# Patient Record
Sex: FEMALE | Race: BLACK OR AFRICAN AMERICAN | URBAN - METROPOLITAN AREA
[De-identification: names, ages, dates, MRNs, and addresses within clinical notes are randomized per-mention and may not be internally consistent; named-entity substitution may affect disease eponyms.]

---

## 2017-06-15 ENCOUNTER — INPATIENT (INPATIENT)
Facility: HOSPITAL | Age: 77
LOS: 10 days | Discharge: NURSING, REHAB OR LONG-TERM CARE | End: 2017-06-26
Attending: HOSPITALIST | Admitting: HOSPITALIST
Payer: MEDICARE

## 2017-06-15 VITALS
TEMPERATURE: 98 F | SYSTOLIC BLOOD PRESSURE: 182 MMHG | HEART RATE: 83 BPM | OXYGEN SATURATION: 99 % | DIASTOLIC BLOOD PRESSURE: 101 MMHG | RESPIRATION RATE: 16 BRPM

## 2017-06-15 DIAGNOSIS — F03.91 UNSPECIFIED DEMENTIA WITH BEHAVIORAL DISTURBANCE: ICD-10-CM

## 2017-06-15 DIAGNOSIS — F03.90 UNSPECIFIED DEMENTIA, UNSPECIFIED SEVERITY, WITHOUT BEHAVIORAL DISTURBANCE, PSYCHOTIC DISTURBANCE, MOOD DISTURBANCE, AND ANXIETY: ICD-10-CM

## 2017-06-15 DIAGNOSIS — I10 ESSENTIAL (PRIMARY) HYPERTENSION: ICD-10-CM

## 2017-06-15 LAB
ALBUMIN SERPL ELPH-MCNC: 3.9 G/DL — SIGNIFICANT CHANGE UP (ref 3.3–5)
ALP SERPL-CCNC: 79 U/L — SIGNIFICANT CHANGE UP (ref 40–120)
ALT FLD-CCNC: 19 U/L — SIGNIFICANT CHANGE UP (ref 4–33)
AST SERPL-CCNC: 40 U/L — HIGH (ref 4–32)
BASOPHILS # BLD AUTO: 0.02 K/UL — SIGNIFICANT CHANGE UP (ref 0–0.2)
BASOPHILS NFR BLD AUTO: 0.3 % — SIGNIFICANT CHANGE UP (ref 0–2)
BILIRUB SERPL-MCNC: 0.5 MG/DL — SIGNIFICANT CHANGE UP (ref 0.2–1.2)
BUN SERPL-MCNC: 19 MG/DL — SIGNIFICANT CHANGE UP (ref 7–23)
CALCIUM SERPL-MCNC: 9.2 MG/DL — SIGNIFICANT CHANGE UP (ref 8.4–10.5)
CHLORIDE SERPL-SCNC: 102 MMOL/L — SIGNIFICANT CHANGE UP (ref 98–107)
CK MB BLD-MCNC: 1.3 — SIGNIFICANT CHANGE UP (ref 0–2.5)
CK MB BLD-MCNC: 10.79 NG/ML — HIGH (ref 1–4.7)
CK MB BLD-MCNC: 9 NG/ML — HIGH (ref 1–4.7)
CK SERPL-CCNC: 716 U/L — HIGH (ref 25–170)
CK SERPL-CCNC: 938 U/L — HIGH (ref 25–170)
CO2 SERPL-SCNC: 22 MMOL/L — SIGNIFICANT CHANGE UP (ref 22–31)
CREAT SERPL-MCNC: 0.83 MG/DL — SIGNIFICANT CHANGE UP (ref 0.5–1.3)
EOSINOPHIL # BLD AUTO: 0.02 K/UL — SIGNIFICANT CHANGE UP (ref 0–0.5)
EOSINOPHIL NFR BLD AUTO: 0.3 % — SIGNIFICANT CHANGE UP (ref 0–6)
GLUCOSE SERPL-MCNC: 67 MG/DL — LOW (ref 70–99)
HCT VFR BLD CALC: 36.6 % — SIGNIFICANT CHANGE UP (ref 34.5–45)
HGB BLD-MCNC: 11.8 G/DL — SIGNIFICANT CHANGE UP (ref 11.5–15.5)
IMM GRANULOCYTES NFR BLD AUTO: 0.2 % — SIGNIFICANT CHANGE UP (ref 0–1.5)
LYMPHOCYTES # BLD AUTO: 2.48 K/UL — SIGNIFICANT CHANGE UP (ref 1–3.3)
LYMPHOCYTES # BLD AUTO: 38.9 % — SIGNIFICANT CHANGE UP (ref 13–44)
MCHC RBC-ENTMCNC: 29.5 PG — SIGNIFICANT CHANGE UP (ref 27–34)
MCHC RBC-ENTMCNC: 32.2 % — SIGNIFICANT CHANGE UP (ref 32–36)
MCV RBC AUTO: 91.5 FL — SIGNIFICANT CHANGE UP (ref 80–100)
MONOCYTES # BLD AUTO: 0.54 K/UL — SIGNIFICANT CHANGE UP (ref 0–0.9)
MONOCYTES NFR BLD AUTO: 8.5 % — SIGNIFICANT CHANGE UP (ref 2–14)
NEUTROPHILS # BLD AUTO: 3.31 K/UL — SIGNIFICANT CHANGE UP (ref 1.8–7.4)
NEUTROPHILS NFR BLD AUTO: 51.8 % — SIGNIFICANT CHANGE UP (ref 43–77)
PLATELET # BLD AUTO: 227 K/UL — SIGNIFICANT CHANGE UP (ref 150–400)
PMV BLD: 9.3 FL — SIGNIFICANT CHANGE UP (ref 7–13)
POTASSIUM SERPL-MCNC: 4.2 MMOL/L — SIGNIFICANT CHANGE UP (ref 3.5–5.3)
POTASSIUM SERPL-SCNC: 4.2 MMOL/L — SIGNIFICANT CHANGE UP (ref 3.5–5.3)
PROT SERPL-MCNC: 7.2 G/DL — SIGNIFICANT CHANGE UP (ref 6–8.3)
RBC # BLD: 4 M/UL — SIGNIFICANT CHANGE UP (ref 3.8–5.2)
RBC # FLD: 13.1 % — SIGNIFICANT CHANGE UP (ref 10.3–14.5)
SODIUM SERPL-SCNC: 142 MMOL/L — SIGNIFICANT CHANGE UP (ref 135–145)
TROPONIN T SERPL-MCNC: < 0.06 NG/ML — SIGNIFICANT CHANGE UP (ref 0–0.06)
TROPONIN T SERPL-MCNC: < 0.06 NG/ML — SIGNIFICANT CHANGE UP (ref 0–0.06)
TSH SERPL-MCNC: 0.81 UIU/ML — SIGNIFICANT CHANGE UP (ref 0.27–4.2)
WBC # BLD: 6.38 K/UL — SIGNIFICANT CHANGE UP (ref 3.8–10.5)
WBC # FLD AUTO: 6.38 K/UL — SIGNIFICANT CHANGE UP (ref 3.8–10.5)

## 2017-06-15 PROCEDURE — 70450 CT HEAD/BRAIN W/O DYE: CPT | Mod: 26

## 2017-06-15 PROCEDURE — 71010: CPT | Mod: 26

## 2017-06-15 PROCEDURE — 99223 1ST HOSP IP/OBS HIGH 75: CPT | Mod: AI

## 2017-06-15 RX ORDER — METOPROLOL TARTRATE 50 MG
1 TABLET ORAL
Qty: 0 | Refills: 0 | COMMUNITY

## 2017-06-15 RX ORDER — METOPROLOL TARTRATE 50 MG
25 TABLET ORAL DAILY
Qty: 0 | Refills: 0 | Status: DISCONTINUED | OUTPATIENT
Start: 2017-06-15 | End: 2017-06-25

## 2017-06-15 RX ORDER — ATORVASTATIN CALCIUM 80 MG/1
1 TABLET, FILM COATED ORAL
Qty: 0 | Refills: 0 | COMMUNITY

## 2017-06-15 RX ORDER — AMLODIPINE BESYLATE 2.5 MG/1
5 TABLET ORAL DAILY
Qty: 0 | Refills: 0 | Status: DISCONTINUED | OUTPATIENT
Start: 2017-06-15 | End: 2017-06-15

## 2017-06-15 RX ORDER — ENOXAPARIN SODIUM 100 MG/ML
40 INJECTION SUBCUTANEOUS EVERY 24 HOURS
Qty: 0 | Refills: 0 | Status: DISCONTINUED | OUTPATIENT
Start: 2017-06-15 | End: 2017-06-26

## 2017-06-15 RX ORDER — LISINOPRIL 2.5 MG/1
10 TABLET ORAL DAILY
Qty: 0 | Refills: 0 | Status: DISCONTINUED | OUTPATIENT
Start: 2017-06-15 | End: 2017-06-21

## 2017-06-15 RX ORDER — ATORVASTATIN CALCIUM 80 MG/1
40 TABLET, FILM COATED ORAL AT BEDTIME
Qty: 0 | Refills: 0 | Status: DISCONTINUED | OUTPATIENT
Start: 2017-06-15 | End: 2017-06-26

## 2017-06-15 RX ADMIN — ENOXAPARIN SODIUM 40 MILLIGRAM(S): 100 INJECTION SUBCUTANEOUS at 21:23

## 2017-06-15 RX ADMIN — ATORVASTATIN CALCIUM 40 MILLIGRAM(S): 80 TABLET, FILM COATED ORAL at 21:23

## 2017-06-15 RX ADMIN — LISINOPRIL 10 MILLIGRAM(S): 2.5 TABLET ORAL at 21:23

## 2017-06-15 NOTE — ED PROVIDER NOTE - MEDICAL DECISION MAKING DETAILS
76F unknown PMH presenting after being found wandering a gas station. Well appearing, in no apparent distress. Will attempt to contact family again.

## 2017-06-15 NOTE — ED PROVIDER NOTE - PROGRESS NOTE DETAILS
Discussed with patient's cousin; pt is demented at baseline, is known to be combative and demented. No one is able to pick her up from the ED at this time. Pt has had no complaints at home. Per cousin the patient has been combative at home and left while the cousin was taking a nap. Discussed with patient's cousin; pt is demented at baseline, is known to be combative and demented. No one is able to pick her up from the ED at this time. Pt has had no complaints at home. Per cousin the patient has been combative at home and left while the cousin was taking a nap. Spoke cousin Di: [phone]  JAMIE Sánchez Cortney: Family member confirms dementia, no other PMH. No one able to come to ED for pt or confirm baseline mental status Will get basic AMS w/u, likely social admit. Nba: density noted on head ct in right ear. pt w/i abundant cerumen in b/l ear canals. cerumen removed from right, still some remaining- pt refusing further removal at this time. ear left ear cleared. will reattempt on right. Nba pgy2: density noted on head ct in right ear. pt w/i abundant cerumen in b/l ear canals. cerumen removed from right, still some remaining- pt refusing further removal at this time. ear left ear cleared. will reattempt on right.

## 2017-06-15 NOTE — ED ADULT NURSE NOTE - OBJECTIVE STATEMENT
Patient a&ox2 (self and location), ambulatory, arrived to ED room C. Patient found on street by EMS per triage note. Patient cannot state why she left her home/ where she was going. Patient states "I was getting my medicine"/ "it was too cold"/ "I was going..i don't know ill tell you later". Patient states it is 1940s, she lives with her mom who is in her 50s. Patient states feel safe at home, did not want to leave family, no thoughts of hurting herself or others. States might have fallen a couple days ago, unsure. Cannot recall medical history. No abrasions noted. Blanchable redness noted to sacrum. BP elevated, MD aware. Pending orders.

## 2017-06-15 NOTE — ED PROVIDER NOTE - ATTENDING CONTRIBUTION TO CARE
76F PMH unknown pmh found wandering in street. Denying any medical complaints. Vitals wnl, exam -very pleasant, slightly confused, non-focal.  ddx: Likely related to dementia  Will attempt to contact family member. If unable then basic AMS w/u - cbc, cmp, tsh, UA, CXR, CTH.

## 2017-06-15 NOTE — H&P ADULT - HISTORY OF PRESENT ILLNESS
76 y.o. Female w/ hx mild dementia who was recently discharged from a [place] assisted living facility (for unknown reason) and now living with her cousin "Janel" was found wandering a gas station. When asked patient doesn't recall why she is in the hospital and wants to go home. The Assisted living facility and Brother Lamont [phone] were not reachable by myself or SW. Patient denies cp, SOB, HA,  palpitations, abdominal pain, N/V/D. In ED BPs were elevated but improved without any antihypertensive meds.

## 2017-06-15 NOTE — H&P ADULT - PROBLEM SELECTOR PLAN 2
Needs NH placement since brother or cousin can't take care of her.   SW and case management consult in a.m.

## 2017-06-15 NOTE — ED PROVIDER NOTE - CONSTITUTIONAL, MLM
normal... Well appearing, well nourished, awake, alert, oriented to person, place and in no apparent distress. - - -

## 2017-06-15 NOTE — ED ADULT TRIAGE NOTE - CHIEF COMPLAINT QUOTE
Pt BIBA after being found wandering around a gas station since midnight. Denies any medical complaints at this time. EMS contacted family- pt has been missing since yesterday and states that she is A&Ox1 at baseline. Family is on their way

## 2017-06-15 NOTE — H&P ADULT - ASSESSMENT
76 y.o. Female w/ Hx Dementia p/w Uncontrolled HTN and wandering due to dementia in need of NH placement.

## 2017-06-15 NOTE — ED PROVIDER NOTE - OBJECTIVE STATEMENT
76F unknown PMH presenting after being found wandering a gas station. Pt denies all complaints at this time. States that she cant remember how she got to the hospital. Does not believe that she fell or got hurt. 76F unknown PMH presenting after being found wandering a gas station. Pt denies all complaints at this time. States that she cant remember how she got to the hospital. Does not believe that she fell or got hurt. Does not knopw family members names/numbers. Denying any complaints - no HA, SOB/CP, NVD, abd pain, urinary complaints, URI symptoms.

## 2017-06-15 NOTE — H&P ADULT - PROBLEM SELECTOR PLAN 1
start Norvasc 5mg PO qdaily since EKG shows LVH. start Norvasc 5mg PO qdaily since EKG shows LVH.  Need to verify home meds. resume Lisinopril and metorpolol.

## 2017-06-16 DIAGNOSIS — I10 ESSENTIAL (PRIMARY) HYPERTENSION: ICD-10-CM

## 2017-06-16 DIAGNOSIS — G93.41 METABOLIC ENCEPHALOPATHY: ICD-10-CM

## 2017-06-16 LAB
ALBUMIN SERPL ELPH-MCNC: 3.7 G/DL — SIGNIFICANT CHANGE UP (ref 3.3–5)
ALP SERPL-CCNC: 82 U/L — SIGNIFICANT CHANGE UP (ref 40–120)
ALT FLD-CCNC: 20 U/L — SIGNIFICANT CHANGE UP (ref 4–33)
APPEARANCE UR: CLEAR — SIGNIFICANT CHANGE UP
AST SERPL-CCNC: 40 U/L — HIGH (ref 4–32)
BASOPHILS # BLD AUTO: 0.02 K/UL — SIGNIFICANT CHANGE UP (ref 0–0.2)
BASOPHILS NFR BLD AUTO: 0.4 % — SIGNIFICANT CHANGE UP (ref 0–2)
BILIRUB SERPL-MCNC: 0.6 MG/DL — SIGNIFICANT CHANGE UP (ref 0.2–1.2)
BILIRUB UR-MCNC: NEGATIVE — SIGNIFICANT CHANGE UP
BLOOD UR QL VISUAL: NEGATIVE — SIGNIFICANT CHANGE UP
BUN SERPL-MCNC: 19 MG/DL — SIGNIFICANT CHANGE UP (ref 7–23)
CALCIUM SERPL-MCNC: 9.3 MG/DL — SIGNIFICANT CHANGE UP (ref 8.4–10.5)
CHLORIDE SERPL-SCNC: 102 MMOL/L — SIGNIFICANT CHANGE UP (ref 98–107)
CK SERPL-CCNC: 709 U/L — HIGH (ref 25–170)
CO2 SERPL-SCNC: 23 MMOL/L — SIGNIFICANT CHANGE UP (ref 22–31)
COLOR SPEC: SIGNIFICANT CHANGE UP
CREAT SERPL-MCNC: 0.85 MG/DL — SIGNIFICANT CHANGE UP (ref 0.5–1.3)
EOSINOPHIL # BLD AUTO: 0.05 K/UL — SIGNIFICANT CHANGE UP (ref 0–0.5)
EOSINOPHIL NFR BLD AUTO: 1.1 % — SIGNIFICANT CHANGE UP (ref 0–6)
GLUCOSE SERPL-MCNC: 73 MG/DL — SIGNIFICANT CHANGE UP (ref 70–99)
GLUCOSE UR-MCNC: NEGATIVE — SIGNIFICANT CHANGE UP
HCT VFR BLD CALC: 38.6 % — SIGNIFICANT CHANGE UP (ref 34.5–45)
HGB BLD-MCNC: 12.5 G/DL — SIGNIFICANT CHANGE UP (ref 11.5–15.5)
IMM GRANULOCYTES NFR BLD AUTO: 0.2 % — SIGNIFICANT CHANGE UP (ref 0–1.5)
KETONES UR-MCNC: NEGATIVE — SIGNIFICANT CHANGE UP
LEUKOCYTE ESTERASE UR-ACNC: NEGATIVE — SIGNIFICANT CHANGE UP
LYMPHOCYTES # BLD AUTO: 2.21 K/UL — SIGNIFICANT CHANGE UP (ref 1–3.3)
LYMPHOCYTES # BLD AUTO: 47.4 % — HIGH (ref 13–44)
MCHC RBC-ENTMCNC: 29.8 PG — SIGNIFICANT CHANGE UP (ref 27–34)
MCHC RBC-ENTMCNC: 32.4 % — SIGNIFICANT CHANGE UP (ref 32–36)
MCV RBC AUTO: 91.9 FL — SIGNIFICANT CHANGE UP (ref 80–100)
MONOCYTES # BLD AUTO: 0.44 K/UL — SIGNIFICANT CHANGE UP (ref 0–0.9)
MONOCYTES NFR BLD AUTO: 9.4 % — SIGNIFICANT CHANGE UP (ref 2–14)
MUCOUS THREADS # UR AUTO: SIGNIFICANT CHANGE UP
NEUTROPHILS # BLD AUTO: 1.93 K/UL — SIGNIFICANT CHANGE UP (ref 1.8–7.4)
NEUTROPHILS NFR BLD AUTO: 41.5 % — LOW (ref 43–77)
NITRITE UR-MCNC: NEGATIVE — SIGNIFICANT CHANGE UP
PH UR: 6.5 — SIGNIFICANT CHANGE UP (ref 4.6–8)
PLATELET # BLD AUTO: 230 K/UL — SIGNIFICANT CHANGE UP (ref 150–400)
PMV BLD: 9.6 FL — SIGNIFICANT CHANGE UP (ref 7–13)
POTASSIUM SERPL-MCNC: 4.4 MMOL/L — SIGNIFICANT CHANGE UP (ref 3.5–5.3)
POTASSIUM SERPL-SCNC: 4.4 MMOL/L — SIGNIFICANT CHANGE UP (ref 3.5–5.3)
PROT SERPL-MCNC: 7.1 G/DL — SIGNIFICANT CHANGE UP (ref 6–8.3)
PROT UR-MCNC: NEGATIVE — SIGNIFICANT CHANGE UP
RBC # BLD: 4.2 M/UL — SIGNIFICANT CHANGE UP (ref 3.8–5.2)
RBC # FLD: 13.2 % — SIGNIFICANT CHANGE UP (ref 10.3–14.5)
RBC CASTS # UR COMP ASSIST: SIGNIFICANT CHANGE UP (ref 0–?)
SODIUM SERPL-SCNC: 141 MMOL/L — SIGNIFICANT CHANGE UP (ref 135–145)
SP GR SPEC: 1.01 — SIGNIFICANT CHANGE UP (ref 1–1.03)
SQUAMOUS # UR AUTO: SIGNIFICANT CHANGE UP
UROBILINOGEN FLD QL: NORMAL E.U. — SIGNIFICANT CHANGE UP (ref 0.1–0.2)
WBC # BLD: 4.66 K/UL — SIGNIFICANT CHANGE UP (ref 3.8–10.5)
WBC # FLD AUTO: 4.66 K/UL — SIGNIFICANT CHANGE UP (ref 3.8–10.5)
WBC UR QL: SIGNIFICANT CHANGE UP (ref 0–?)

## 2017-06-16 PROCEDURE — 99223 1ST HOSP IP/OBS HIGH 75: CPT

## 2017-06-16 RX ORDER — HALOPERIDOL DECANOATE 100 MG/ML
1 INJECTION INTRAMUSCULAR EVERY 6 HOURS
Qty: 0 | Refills: 0 | Status: DISCONTINUED | OUTPATIENT
Start: 2017-06-16 | End: 2017-06-26

## 2017-06-16 RX ORDER — QUETIAPINE FUMARATE 200 MG/1
50 TABLET, FILM COATED ORAL EVERY 6 HOURS
Qty: 0 | Refills: 0 | Status: DISCONTINUED | OUTPATIENT
Start: 2017-06-16 | End: 2017-06-26

## 2017-06-16 RX ADMIN — ENOXAPARIN SODIUM 40 MILLIGRAM(S): 100 INJECTION SUBCUTANEOUS at 21:40

## 2017-06-16 RX ADMIN — LISINOPRIL 10 MILLIGRAM(S): 2.5 TABLET ORAL at 06:14

## 2017-06-16 RX ADMIN — ATORVASTATIN CALCIUM 40 MILLIGRAM(S): 80 TABLET, FILM COATED ORAL at 21:40

## 2017-06-16 RX ADMIN — Medication 25 MILLIGRAM(S): at 11:14

## 2017-06-16 NOTE — BEHAVIORAL HEALTH ASSESSMENT NOTE - NSBHCONSULTRECOMMENDOTHER_PSY_A_CORE FT
1 - Cannot leave AMA  2 - Cannot make medical decisions  3 - Does not require standing medications since pt has not had any behavioral issues at this time.  4 - For agitation, Haldol 1mg PO/IV/IM q6h   5 - For anxiety or insomnia, Seroquel 50mg PO q6h  6 - Will follow.  Will need safe d/c planning

## 2017-06-16 NOTE — BEHAVIORAL HEALTH ASSESSMENT NOTE - HPI (INCLUDE ILLNESS QUALITY, SEVERITY, DURATION, TIMING, CONTEXT, MODIFYING FACTORS, ASSOCIATED SIGNS AND SYMPTOMS)
HPI:  76 y.o. Female w/ hx mild dementia who was recently discharged from a [place] assisted living facility (for unknown reason) and now living with her cousin "Janel" was found wandering a gas station. When asked patient doesn't recall why she is in the hospital and wants to go home. The Assisted living facility and Brother Lamont [phone] were not reachable by myself or SW. Patient denies cp, SOB, HA,  palpitations, abdominal pain, N/V/D. In ED BPs were elevated but improved without any antihypertensive meds. (15 Enoch 2017 15:08)    Seen and examined at bedside.  Pt calm and cooperative though confused.  Oriented to self only.  Pt is a poor historian and provides unreliable history.  Denies mood or psychotic sxs.  Denies SI/HI/AH/VH.      Per family, pt has been living is assisted living facilities for ~2 years.  States she has been going from facility to facility in [place].  Was recently "kicked out" of a facility in [place] and was temporarily living with cousin Di in [place].  Pt is  with 1 surviving son and 3 grandchildren.  Her brother Lamont has been responsible for her care, but he is unable to continue as he himself resides in a nursing home s/p stroke and seizure.  Lamont hopes that granddaughter Libertad will assume responsibility for pt.      Di (cousin) [phone]   Lamont (brother) [phone]  Libertad (granddaughter) [phone]  Eladio Pardo (SW from most recent assisted living) [phone]

## 2017-06-16 NOTE — BEHAVIORAL HEALTH ASSESSMENT NOTE - NSBHCHARTREVIEWVS_PSY_A_CORE FT
Vital Signs Last 24 Hrs  T(C): 36.9, Max: 37.3 (06-15 @ 20:03)  T(F): 98.4, Max: 99.1 (06-15 @ 20:03)  HR: 62 (61 - 66)  BP: 147/68 (126/59 - 153/61)  BP(mean): 91 (91 - 91)  RR: 18 (17 - 18)  SpO2: 100% (99% - 100%)

## 2017-06-16 NOTE — PROGRESS NOTE ADULT - PROBLEM SELECTOR PLAN 2
per report from family pt has had behavioral issues and requires placement per report from family pt has had behavioral issues and requires placement or aides.  CM working with family for dispo

## 2017-06-16 NOTE — PROGRESS NOTE ADULT - SUBJECTIVE AND OBJECTIVE BOX
Patient is a 76y old  Female who presents with metabolic enceph likely due to dementia      SUBJECTIVE / OVERNIGHT EVENTS: pleasant, eating lunch    MEDICATIONS  (STANDING):  enoxaparin Injectable 40milliGRAM(s) SubCutaneous every 24 hours  metoprolol succinate ER 25milliGRAM(s) Oral daily  lisinopril 10milliGRAM(s) Oral daily  atorvastatin 40milliGRAM(s) Oral at bedtime    MEDICATIONS  (PRN):      Vital Signs Last 24 Hrs  T(C): 36.8, Max: 37.3 (06-15 @ 20:03)  HR: 66 (61 - 66)  BP: 126/59 (126/59 - 153/61)  RR: 17 (17 - 18)  SpO2: 99% (99% - 100%)      PHYSICAL EXAM:  GENERAL: NAD, well-developed  HEAD:  Atraumatic, Normocephalic  EYES: EOMI, PERRLA, conjunctiva and sclera clear  NECK: Supple, No JVD  CHEST/LUNG: Clear to auscultation bilaterally; No wheeze  HEART: Regular rate and rhythm; No murmurs, rubs, or gallops  ABDOMEN: Soft, Nontender, Nondistended; Bowel sounds present  EXTREMITIES:  2+ Peripheral Pulses, No clubbing, cyanosis, or edema  PSYCH: AAOx3  NEUROLOGY: non-focal  SKIN: No rashes or lesions    LABS:                        12.5   4.66  )-----------( 230      ( 16 Jun 2017 05:45 )             38.6     06-16    141  |  102  |  19  ----------------------------<  73  4.4   |  23  |  0.85    Ca    9.3      16 Jun 2017 05:45    TPro  7.1  /  Alb  3.7  /  TBili  0.6  /  DBili  x   /  AST  40<H>  /  ALT  20  /  AlkPhos  82  06-16      CARDIAC MARKERS ( 16 Jun 2017 05:45 )  x     / x     / 709 u/L / x     / x      CARDIAC MARKERS ( 15 Enoch 2017 14:11 )  x     / < 0.06 ng/mL / 938 u/L / 10.79 ng/mL / x      CARDIAC MARKERS ( 15 Enoch 2017 07:30 )  x     / < 0.06 ng/mL / 716 u/L / 9.00 ng/mL / x          : Patient is a 76y old  Female who presents with metabolic enceph likely due to dementia      SUBJECTIVE / OVERNIGHT EVENTS: pleasant, eating lunch    MEDICATIONS  (STANDING):  enoxaparin Injectable 40milliGRAM(s) SubCutaneous every 24 hours  metoprolol succinate ER 25milliGRAM(s) Oral daily  lisinopril 10milliGRAM(s) Oral daily  atorvastatin 40milliGRAM(s) Oral at bedtime    MEDICATIONS  (PRN):      Vital Signs Last 24 Hrs  T(C): 36.8, Max: 37.3 (06-15 @ 20:03)  HR: 66 (61 - 66)  BP: 126/59 (126/59 - 153/61)  RR: 17 (17 - 18)  SpO2: 99% (99% - 100%)      PHYSICAL EXAM:  GENERAL: NAD, well-developed  HEAD:  Atraumatic, Normocephalic  EYES: EOMI, PERRLA, conjunctiva and sclera clear  CHEST/LUNG: non labored  ABDOMEN: Soft, Nontender, Nondistended  EXTREMITIES:  No clubbing, cyanosis, or edema  PSYCH: pleasant but confused,   NEUROLOGY: amb      LABS:                        12.5   4.66  )-----------( 230      ( 16 Jun 2017 05:45 )             38.6     06-16    141  |  102  |  19  ----------------------------<  73  4.4   |  23  |  0.85    Ca    9.3      16 Jun 2017 05:45    TPro  7.1  /  Alb  3.7  /  TBili  0.6  /  DBili  x   /  AST  40<H>  /  ALT  20  /  AlkPhos  82  06-16      CARDIAC MARKERS ( 16 Jun 2017 05:45 )  x     / x     / 709 u/L / x     / x      CARDIAC MARKERS ( 15 Enoch 2017 14:11 )  x     / < 0.06 ng/mL / 938 u/L / 10.79 ng/mL / x      CARDIAC MARKERS ( 15 Enoch 2017 07:30 )  x     / < 0.06 ng/mL / 716 u/L / 9.00 ng/mL / x          :

## 2017-06-16 NOTE — BEHAVIORAL HEALTH ASSESSMENT NOTE - NSBHCHARTREVIEWLAB_PSY_A_CORE FT
12.5   4.66  )-----------( 230      ( 16 Jun 2017 05:45 )             38.6   06-16    141  |  102  |  19  ----------------------------<  73  4.4   |  23  |  0.85    Ca    9.3      16 Jun 2017 05:45    TPro  7.1  /  Alb  3.7  /  TBili  0.6  /  DBili  x   /  AST  40<H>  /  ALT  20  /  AlkPhos  82  06-16    Creatine Kinase, Serum: 709 (06.16.17 @ 05:45)  Creatine Kinase, Serum: 938 (06.15.17 @ 14:11)  CKMB: 10.79 ng/mL (06.15.17 @ 14:11)  CKMB: 9.00 ng/mL (06.15.17 @ 07:30)

## 2017-06-16 NOTE — BEHAVIORAL HEALTH ASSESSMENT NOTE - NSBHCHARTREVIEWIMAGING_PSY_A_CORE FT
EXAM:  CT BRAIN    PROCEDURE DATE:  Enoch 15 2017   IMPRESSION:    No acute intracranial hemorrhage or mass effect. If symptoms persist,   short-term follow-up with brain CT or MRI is recommended if there are no   contraindications.    An indeterminate metallic density is noted within the right external   auditory canal. Although this may reflect a hearing device, correlation   with direct visualization is recommended to exclude possibility of a   foreign body.    EXAM:  RAD CHEST PORTABLE ROUTINE    PROCEDURE DATE:  Enoch 15 2017   IMPRESSION:  No acute pulmonary disease.

## 2017-06-16 NOTE — BEHAVIORAL HEALTH ASSESSMENT NOTE - CASE SUMMARY
77 yo BF with dementia   1-patient will need placement  2-patient cannot make any medical decisions or discharge planning  3-haldol 1mg prn Q6 hour PO/IM/IV

## 2017-06-16 NOTE — PHYSICAL THERAPY INITIAL EVALUATION ADULT - PERTINENT HX OF CURRENT PROBLEM, REHAB EVAL
This is a 77 y/o Female w/ Hx Dementia p/w Uncontrolled HTN and wandering due to dementia in need of NH placement.

## 2017-06-16 NOTE — PHYSICAL THERAPY INITIAL EVALUATION ADULT - CRITERIA FOR SKILLED THERAPEUTIC INTERVENTIONS
Patient is functioning independently at this time with no LOB,hence not a candidate for Restorative PT .

## 2017-06-17 LAB
BASOPHILS # BLD AUTO: 0.02 K/UL — SIGNIFICANT CHANGE UP (ref 0–0.2)
BASOPHILS NFR BLD AUTO: 0.4 % — SIGNIFICANT CHANGE UP (ref 0–2)
BUN SERPL-MCNC: 18 MG/DL — SIGNIFICANT CHANGE UP (ref 7–23)
CALCIUM SERPL-MCNC: 9.1 MG/DL — SIGNIFICANT CHANGE UP (ref 8.4–10.5)
CHLORIDE SERPL-SCNC: 104 MMOL/L — SIGNIFICANT CHANGE UP (ref 98–107)
CK MB BLD-MCNC: 1.1 — SIGNIFICANT CHANGE UP (ref 0–2.5)
CK MB BLD-MCNC: 5.69 NG/ML — HIGH (ref 1–4.7)
CK SERPL-CCNC: 538 U/L — HIGH (ref 25–170)
CO2 SERPL-SCNC: 23 MMOL/L — SIGNIFICANT CHANGE UP (ref 22–31)
CREAT SERPL-MCNC: 0.86 MG/DL — SIGNIFICANT CHANGE UP (ref 0.5–1.3)
EOSINOPHIL # BLD AUTO: 0.05 K/UL — SIGNIFICANT CHANGE UP (ref 0–0.5)
EOSINOPHIL NFR BLD AUTO: 0.9 % — SIGNIFICANT CHANGE UP (ref 0–6)
GLUCOSE SERPL-MCNC: 75 MG/DL — SIGNIFICANT CHANGE UP (ref 70–99)
HCT VFR BLD CALC: 38.6 % — SIGNIFICANT CHANGE UP (ref 34.5–45)
HGB BLD-MCNC: 12.4 G/DL — SIGNIFICANT CHANGE UP (ref 11.5–15.5)
IMM GRANULOCYTES NFR BLD AUTO: 0.2 % — SIGNIFICANT CHANGE UP (ref 0–1.5)
LYMPHOCYTES # BLD AUTO: 3.01 K/UL — SIGNIFICANT CHANGE UP (ref 1–3.3)
LYMPHOCYTES # BLD AUTO: 53.8 % — HIGH (ref 13–44)
MCHC RBC-ENTMCNC: 29.6 PG — SIGNIFICANT CHANGE UP (ref 27–34)
MCHC RBC-ENTMCNC: 32.1 % — SIGNIFICANT CHANGE UP (ref 32–36)
MCV RBC AUTO: 92.1 FL — SIGNIFICANT CHANGE UP (ref 80–100)
MONOCYTES # BLD AUTO: 0.41 K/UL — SIGNIFICANT CHANGE UP (ref 0–0.9)
MONOCYTES NFR BLD AUTO: 7.3 % — SIGNIFICANT CHANGE UP (ref 2–14)
NEUTROPHILS # BLD AUTO: 2.09 K/UL — SIGNIFICANT CHANGE UP (ref 1.8–7.4)
NEUTROPHILS NFR BLD AUTO: 37.4 % — LOW (ref 43–77)
PLATELET # BLD AUTO: 239 K/UL — SIGNIFICANT CHANGE UP (ref 150–400)
PMV BLD: 9.6 FL — SIGNIFICANT CHANGE UP (ref 7–13)
POTASSIUM SERPL-MCNC: 4.3 MMOL/L — SIGNIFICANT CHANGE UP (ref 3.5–5.3)
POTASSIUM SERPL-SCNC: 4.3 MMOL/L — SIGNIFICANT CHANGE UP (ref 3.5–5.3)
RBC # BLD: 4.19 M/UL — SIGNIFICANT CHANGE UP (ref 3.8–5.2)
RBC # FLD: 13 % — SIGNIFICANT CHANGE UP (ref 10.3–14.5)
SODIUM SERPL-SCNC: 141 MMOL/L — SIGNIFICANT CHANGE UP (ref 135–145)
WBC # BLD: 5.59 K/UL — SIGNIFICANT CHANGE UP (ref 3.8–10.5)
WBC # FLD AUTO: 5.59 K/UL — SIGNIFICANT CHANGE UP (ref 3.8–10.5)

## 2017-06-17 PROCEDURE — 99231 SBSQ HOSP IP/OBS SF/LOW 25: CPT

## 2017-06-17 RX ADMIN — ATORVASTATIN CALCIUM 40 MILLIGRAM(S): 80 TABLET, FILM COATED ORAL at 21:46

## 2017-06-17 RX ADMIN — LISINOPRIL 10 MILLIGRAM(S): 2.5 TABLET ORAL at 05:18

## 2017-06-17 RX ADMIN — ENOXAPARIN SODIUM 40 MILLIGRAM(S): 100 INJECTION SUBCUTANEOUS at 21:46

## 2017-06-17 NOTE — PROGRESS NOTE ADULT - ASSESSMENT
Assessment and Plan:   75 yo female with ho dementia a/w metabolic enceph due to dementia.  Family unable to care for pt    1.Metabolic encephalopathy.     likely due to dementia  f/u with psych     2. Dementia with behavioral disturbance, unspecified dementia type.    require placement due to her behavioural issues     CM working with family for dispoper report from family pt has had behavioral issues and requires placement.     3. HTN (hypertension).    better controlled   cont lisinopril and metoprolol  monitor BP, HR.     Gi and DVT prophylaxis

## 2017-06-17 NOTE — PROGRESS NOTE ADULT - SUBJECTIVE AND OBJECTIVE BOX
76y old  Female who presents with metabolic enceph likely due to dementia    SUBJECTIVE / OVERNIGHT EVENTS:  no acute issue   pleasant and not in distress , walking in hallway     MEDICATIONS  (STANDING):  enoxaparin Injectable 40milliGRAM(s) SubCutaneous every 24 hours  metoprolol succinate ER 25milliGRAM(s) Oral daily  lisinopril 10milliGRAM(s) Oral daily  atorvastatin 40milliGRAM(s) Oral at bedtime    MEDICATIONS  (PRN):  haloperidol     Tablet 1milliGRAM(s) Oral every 6 hours PRN Agitation  haloperidol    Injectable 1milliGRAM(s) IV Push every 6 hours PRN Agitation  haloperidol    Injectable 1milliGRAM(s) IntraMuscular every 6 hours PRN Agitation  QUEtiapine 50milliGRAM(s) Oral every 6 hours PRN Anxiety or insomnia      Vital Signs Last 24 Hrs  T(C): 36.8, Max: 36.9 (06-16 @ 14:16)  T(F): 98.3, Max: 98.4 (06-16 @ 14:16)  HR: 65 (54 - 68)  BP: 141/81 (121/65 - 160/82)  BP(mean): --  RR: 18 (17 - 18)  SpO2: 100% (97% - 100%)    PHYSICAL EXAM:  GENERAL: NAD, well-developed  HEAD:  Atraumatic, Normocephalic  EYES: EOMI, PERRLA, conjunctiva and sclera clear  CHEST/LUNG: non labored  ABDOMEN: Soft, Nontender, Nondistended  EXTREMITIES:  No clubbing, cyanosis, or edema  PSYCH: pleasant but confused,   NEUROLOGY: amb                          12.4   5.59  )-----------( 239      ( 17 Jun 2017 06:30 )             38.6       06-17    141  |  104  |  18  ----------------------------<  75  4.3   |  23  |  0.86    Ca    9.1      17 Jun 2017 06:30    TPro  7.1  /  Alb  3.7  /  TBili  0.6  /  DBili  x   /  AST  40<H>  /  ALT  20  /  AlkPhos  82  06-16    troponin x 3 -ve 76y old  Female who presents with metabolic enceph likely due to dementia    SUBJECTIVE / OVERNIGHT EVENTS:  no acute issue   pleasant and not in distress , walking in hallway     MEDICATIONS  (STANDING):  enoxaparin Injectable 40milliGRAM(s) SubCutaneous every 24 hours  metoprolol succinate ER 25milliGRAM(s) Oral daily  lisinopril 10milliGRAM(s) Oral daily  atorvastatin 40milliGRAM(s) Oral at bedtime    MEDICATIONS  (PRN):  haloperidol     Tablet 1milliGRAM(s) Oral every 6 hours PRN Agitation  haloperidol    Injectable 1milliGRAM(s) IV Push every 6 hours PRN Agitation  haloperidol    Injectable 1milliGRAM(s) IntraMuscular every 6 hours PRN Agitation  QUEtiapine 50milliGRAM(s) Oral every 6 hours PRN Anxiety or insomnia      Vital Signs Last 24 Hrs  T(C): 36.8, Max: 36.9 (06-16 @ 14:16)  T(F): 98.3, Max: 98.4 (06-16 @ 14:16)  HR: 65 (54 - 68)  BP: 141/81 (121/65 - 160/82)  BP(mean): --  RR: 18 (17 - 18)  SpO2: 100% (97% - 100%)    PHYSICAL EXAM:  GENERAL: NAD, well-developed  HEAD:  Atraumatic, Normocephalic  EYES: EOMI, PERRLA, conjunctiva and sclera clear  CHEST/LUNG: non labored  ABDOMEN: Soft, Nontender, Nondistended  EXTREMITIES:  No clubbing, cyanosis, or edema  PSYCH: pleasant but confused,   NEUROLOGY: amb                          12.4   5.59  )-----------( 239      ( 17 Jun 2017 06:30 )             38.6       06-17    141  |  104  |  18  ----------------------------<  75  4.3   |  23  |  0.86    Ca    9.1      17 Jun 2017 06:30    TPro  7.1  /  Alb  3.7  /  TBili  0.6  /  DBili  x   /  AST  40<H>  /  ALT  20  /  AlkPhos  82  06-16    troponin x 2 -ve

## 2017-06-18 LAB
BACTERIA UR CULT: SIGNIFICANT CHANGE UP
SPECIMEN SOURCE: SIGNIFICANT CHANGE UP

## 2017-06-18 PROCEDURE — 99231 SBSQ HOSP IP/OBS SF/LOW 25: CPT

## 2017-06-18 RX ADMIN — ATORVASTATIN CALCIUM 40 MILLIGRAM(S): 80 TABLET, FILM COATED ORAL at 21:27

## 2017-06-18 RX ADMIN — ENOXAPARIN SODIUM 40 MILLIGRAM(S): 100 INJECTION SUBCUTANEOUS at 21:27

## 2017-06-18 RX ADMIN — Medication 25 MILLIGRAM(S): at 09:21

## 2017-06-18 RX ADMIN — LISINOPRIL 10 MILLIGRAM(S): 2.5 TABLET ORAL at 05:48

## 2017-06-18 NOTE — PROGRESS NOTE ADULT - SUBJECTIVE AND OBJECTIVE BOX
76y old  Female who presents with metabolic enceph likely due to dementia.    SUBJECTIVE / OVERNIGHT EVENTS:  no acute issue   pleasant and not in distress     MEDICATIONS  (STANDING):  enoxaparin Injectable 40milliGRAM(s) SubCutaneous every 24 hours  metoprolol succinate ER 25milliGRAM(s) Oral daily  lisinopril 10milliGRAM(s) Oral daily  atorvastatin 40milliGRAM(s) Oral at bedtime    MEDICATIONS  (PRN):  haloperidol     Tablet 1milliGRAM(s) Oral every 6 hours PRN Agitation  haloperidol    Injectable 1milliGRAM(s) IV Push every 6 hours PRN Agitation  haloperidol    Injectable 1milliGRAM(s) IntraMuscular every 6 hours PRN Agitation  QUEtiapine 50milliGRAM(s) Oral every 6 hours PRN Anxiety or insomnia      Vital Signs Last 24 Hrs  T(C): 36.8, Max: 36.9 (06-17 @ 20:42)  T(F): 98.2, Max: 98.4 (06-17 @ 20:42)  HR: 70 (55 - 73)  BP: 139/75 (139/75 - 163/84)  BP(mean): --  RR: 18 (16 - 18)  SpO2: 100% (100% - 100%)    GENERAL: NAD, well-developed  HEAD:  Atraumatic, Normocephalic  EYES: EOMI, PERRLA, conjunctiva and sclera clear  CHEST/LUNG: non labored  ABDOMEN: Soft, Nontender, Nondistended  EXTREMITIES:  No clubbing, cyanosis, or edema  PSYCH: pleasant but confused,   NEUROLOGY: amb                            12.4   5.59  )-----------( 239      ( 17 Jun 2017 06:30 )             38.6       06-17    141  |  104  |  18  ----------------------------<  76  4.3   |  23  |  0.86    Ca    9.1      17 Jun 2017 06:30

## 2017-06-18 NOTE — PROGRESS NOTE ADULT - ASSESSMENT
Assessment and Plan:   77 yo female with ho dementia a/w metabolic enceph due to dementia.  Family unable to care for pt    1.Metabolic encephalopathy.     likely due to dementia  f/u with psych     2. Dementia with behavioral disturbance, unspecified dementia type.    require placement due to her behavioural issues     CM working with family for dispoper report from family pt has had behavioral issues and requires placement.     3. HTN (hypertension).    better controlled   cont lisinopril and metoprolol  monitor BP, HR.     Gi and DVT prophylaxis     DC planning : KAIT aware

## 2017-06-19 PROCEDURE — 99233 SBSQ HOSP IP/OBS HIGH 50: CPT

## 2017-06-19 RX ADMIN — Medication 25 MILLIGRAM(S): at 05:22

## 2017-06-19 RX ADMIN — LISINOPRIL 10 MILLIGRAM(S): 2.5 TABLET ORAL at 05:22

## 2017-06-19 RX ADMIN — ENOXAPARIN SODIUM 40 MILLIGRAM(S): 100 INJECTION SUBCUTANEOUS at 21:28

## 2017-06-19 RX ADMIN — QUETIAPINE FUMARATE 50 MILLIGRAM(S): 200 TABLET, FILM COATED ORAL at 13:29

## 2017-06-19 RX ADMIN — ATORVASTATIN CALCIUM 40 MILLIGRAM(S): 80 TABLET, FILM COATED ORAL at 21:28

## 2017-06-19 NOTE — PROGRESS NOTE ADULT - SUBJECTIVE AND OBJECTIVE BOX
Patient is a 76y old  Female who presents with a chief complaint of wandering a gas; need NH placement. (15 Enoch 2017 15:08)    SUBJECTIVE / OVERNIGHT EVENTS:  "Doing just fine." Eating without problem.  Says she lives with her "mom who is 60-something."  Oriented to "[facility] 1960-something, President "of course."    MEDICATIONS  (STANDING):  enoxaparin Injectable 40milliGRAM(s) SubCutaneous every 24 hours  metoprolol succinate ER 25milliGRAM(s) Oral daily  lisinopril 10milliGRAM(s) Oral daily  atorvastatin 40milliGRAM(s) Oral at bedtime    MEDICATIONS  (PRN):  haloperidol     Tablet 1milliGRAM(s) Oral every 6 hours PRN Agitation  haloperidol    Injectable 1milliGRAM(s) IV Push every 6 hours PRN Agitation  haloperidol    Injectable 1milliGRAM(s) IntraMuscular every 6 hours PRN Agitation  QUEtiapine 50milliGRAM(s) Oral every 6 hours PRN Anxiety or insomnia    Vital Signs Last 24 Hrs  T(C): 36.6, Max: 36.8 (06-18 @ 20:23)  HR: 80 (53 - 80)  BP: 151/75 (151/75 - 160/79)  RR: 18 (16 - 18)  SpO2: 100% (100% - 100%)  Wt(kg): --  CAPILLARY BLOOD GLUCOSE    PHYSICAL EXAM:  GENERAL: NAD, well-developed  HEAD:  Atraumatic, Normocephalic  EYES: EOMI, PERRLA, conjunctiva and sclera clear  NECK: Supple, No JVD  CHEST/LUNG: Clear to auscultation bilaterally; No wheeze  HEART: Regular rate and rhythm; No murmurs, rubs, or gallops  ABDOMEN: Soft, Nontender, Nondistended; Bowel sounds present  EXTREMITIES:  2+ Peripheral Pulses, No clubbing, cyanosis, or edema  PSYCH:  Oriented to "[facility], 1960-something, President "of course."  Calm and pleasant  NEUROLOGY: non-focal  SKIN: No rashes or lesions    LABS:    RADIOLOGY & ADDITIONAL TESTS:    Imaging Personally Reviewed:    Consultant(s) Notes Reviewed:  psychiatry    Care Discussed with Consultants/Other Providers:

## 2017-06-19 NOTE — PROGRESS NOTE BEHAVIORAL HEALTH - CASE SUMMARY
75 yo BF with Dementia   1- would continue same prns  2-await placement  3-pt cannot make any medical or discharge planning decisions.

## 2017-06-19 NOTE — PROGRESS NOTE BEHAVIORAL HEALTH - NSBHCONSULTRECOMMENDOTHER_PSY_A_CORE FT
1 - Cannot leave AMA  2 - Cannot make medical decisions  3 - Continue PRNs.  No standing medications.  4 - Will follow.  Will need safe d/c planning

## 2017-06-19 NOTE — PROGRESS NOTE BEHAVIORAL HEALTH - SUMMARY
77 yo female with mild dementia who was recently discharged from a [place] assisted living facility and now living with her cousin was found wandering a gas station.  Pleasantly confused.  No behavioral issues.  Needs placement.

## 2017-06-19 NOTE — PROGRESS NOTE BEHAVIORAL HEALTH - NSBHFUPINTERVALHXFT_PSY_A_CORE
No acute events O/N.  No PRNs.  Seen and examined at bedside.  Pt calm and cooperative.  Remains disoriented to time.  Able to state she is in the hospital.  No behavioral issues.  Denies SI/HI/AH/VH.

## 2017-06-19 NOTE — PROGRESS NOTE ADULT - ASSESSMENT
77 yo female with ho dementia a/w metabolic enceph due to dementia.  Reportedly pt recently in a shelter and a medical facility in [place], found wandering, brother Lamont is in rehab after a stroke, granddaughter Libertad involved.

## 2017-06-20 LAB
ANISOCYTOSIS BLD QL: SLIGHT — SIGNIFICANT CHANGE UP
BASOPHILS NFR SPEC: 0 % — SIGNIFICANT CHANGE UP (ref 0–2)
BUN SERPL-MCNC: 20 MG/DL — SIGNIFICANT CHANGE UP (ref 7–23)
CALCIUM SERPL-MCNC: 9.4 MG/DL — SIGNIFICANT CHANGE UP (ref 8.4–10.5)
CHLORIDE SERPL-SCNC: 104 MMOL/L — SIGNIFICANT CHANGE UP (ref 98–107)
CO2 SERPL-SCNC: 25 MMOL/L — SIGNIFICANT CHANGE UP (ref 22–31)
CREAT SERPL-MCNC: 0.97 MG/DL — SIGNIFICANT CHANGE UP (ref 0.5–1.3)
EOSINOPHIL NFR FLD: 2 % — SIGNIFICANT CHANGE UP (ref 0–6)
GLUCOSE SERPL-MCNC: 77 MG/DL — SIGNIFICANT CHANGE UP (ref 70–99)
HCT VFR BLD CALC: 37.2 % — SIGNIFICANT CHANGE UP (ref 34.5–45)
HGB BLD-MCNC: 12.2 G/DL — SIGNIFICANT CHANGE UP (ref 11.5–15.5)
LYMPHOCYTES NFR SPEC AUTO: 56 % — HIGH (ref 13–44)
MACROCYTES BLD QL: SLIGHT — SIGNIFICANT CHANGE UP
MANUAL SMEAR VERIFICATION: SIGNIFICANT CHANGE UP
MCHC RBC-ENTMCNC: 30.6 PG — SIGNIFICANT CHANGE UP (ref 27–34)
MCHC RBC-ENTMCNC: 32.8 % — SIGNIFICANT CHANGE UP (ref 32–36)
MCV RBC AUTO: 93.2 FL — SIGNIFICANT CHANGE UP (ref 80–100)
MONOCYTES NFR BLD: 3 % — SIGNIFICANT CHANGE UP (ref 2–9)
NEUTROPHIL AB SER-ACNC: 39 % — LOW (ref 43–77)
OVALOCYTES BLD QL SMEAR: SLIGHT — SIGNIFICANT CHANGE UP
PLATELET # BLD AUTO: 237 K/UL — SIGNIFICANT CHANGE UP (ref 150–400)
PLATELET COUNT - ESTIMATE: NORMAL — SIGNIFICANT CHANGE UP
PMV BLD: 9.9 FL — SIGNIFICANT CHANGE UP (ref 7–13)
POLYCHROMASIA BLD QL SMEAR: SLIGHT — SIGNIFICANT CHANGE UP
POTASSIUM SERPL-MCNC: 4.2 MMOL/L — SIGNIFICANT CHANGE UP (ref 3.5–5.3)
POTASSIUM SERPL-SCNC: 4.2 MMOL/L — SIGNIFICANT CHANGE UP (ref 3.5–5.3)
RBC # BLD: 3.99 M/UL — SIGNIFICANT CHANGE UP (ref 3.8–5.2)
RBC # FLD: 13.4 % — SIGNIFICANT CHANGE UP (ref 10.3–14.5)
SODIUM SERPL-SCNC: 141 MMOL/L — SIGNIFICANT CHANGE UP (ref 135–145)
WBC # BLD: 4.44 K/UL — SIGNIFICANT CHANGE UP (ref 3.8–10.5)
WBC # FLD AUTO: 4.44 K/UL — SIGNIFICANT CHANGE UP (ref 3.8–10.5)

## 2017-06-20 PROCEDURE — 99232 SBSQ HOSP IP/OBS MODERATE 35: CPT

## 2017-06-20 PROCEDURE — 99233 SBSQ HOSP IP/OBS HIGH 50: CPT

## 2017-06-20 RX ADMIN — Medication 25 MILLIGRAM(S): at 11:18

## 2017-06-20 RX ADMIN — LISINOPRIL 10 MILLIGRAM(S): 2.5 TABLET ORAL at 05:05

## 2017-06-20 RX ADMIN — ENOXAPARIN SODIUM 40 MILLIGRAM(S): 100 INJECTION SUBCUTANEOUS at 21:20

## 2017-06-20 RX ADMIN — ATORVASTATIN CALCIUM 40 MILLIGRAM(S): 80 TABLET, FILM COATED ORAL at 21:20

## 2017-06-20 NOTE — PROGRESS NOTE BEHAVIORAL HEALTH - NSBHFUPINTERVALHXFT_PSY_A_CORE
Received Seroquel 50mg PO at 1329.  Per staff, pt became agitated and was not redirectable.  Responded well to Seroquel PRN.  Seen and examined at bedside.  Pt does not recall outburst yesterday.  Pt calm, cooperative, and pleasant.  Aware she is in the hospital.  Not oriented to time.  No complaints offered.  Denies SI/HI/AH/VH.

## 2017-06-20 NOTE — PROGRESS NOTE ADULT - PROBLEM SELECTOR PLAN 3
cont lisinopril and metoprolol  monitor BP, HR
continue with lisinopril and metoprolol  monitor BP, HR
cont lisinopril and metoprolol  monitor BP, HR

## 2017-06-20 NOTE — PROGRESS NOTE ADULT - SUBJECTIVE AND OBJECTIVE BOX
Patient is a 76y old  Female who presents with a chief complaint of wandering a gas; need NH placement.    SUBJECTIVE / OVERNIGHT EVENTS:  Says she's doing just fine.  Eating and drinking well.  No c/o pain.    MEDICATIONS  (STANDING):  enoxaparin Injectable 40milliGRAM(s) SubCutaneous every 24 hours  metoprolol succinate ER 25milliGRAM(s) Oral daily  lisinopril 10milliGRAM(s) Oral daily  atorvastatin 40milliGRAM(s) Oral at bedtime    MEDICATIONS  (PRN):  haloperidol     Tablet 1milliGRAM(s) Oral every 6 hours PRN Agitation  haloperidol    Injectable 1milliGRAM(s) IV Push every 6 hours PRN Agitation  haloperidol    Injectable 1milliGRAM(s) IntraMuscular every 6 hours PRN Agitation  QUEtiapine 50milliGRAM(s) Oral every 6 hours PRN Anxiety or insomnia    Vital Signs Last 24 Hrs  T(C): 37.1, Max: 37.1 (06-20 @ 13:02)  HR: 67 (52 - 67)  BP: 154/96 (133/72 - 154/96)  RR: 18 (17 - 18)  SpO2: 100% (100% - 100%)  Wt(kg): --  CAPILLARY BLOOD GLUCOSE    PHYSICAL EXAM:  GENERAL: NAD, well-developed  HEAD:  Atraumatic, Normocephalic  EYES: EOMI, PERRLA, conjunctiva and sclera clear  NECK: Supple, No JVD  CHEST/LUNG: Clear to auscultation bilaterally; No wheeze  HEART: Regular rate and rhythm; No murmurs, rubs, or gallops  ABDOMEN: Soft, Nontender, Nondistended; Bowel sounds present  EXTREMITIES:  2+ Peripheral Pulses, No clubbing, cyanosis, or edema  PSYCH: calm  NEUROLOGY: non-focal  SKIN: No rashes or lesions    LABS:                        12.2   4.44  )-----------( 237      ( 20 Jun 2017 05:00 )             37.2     06-20    141  |  104  |  20  ----------------------------<  77  4.2   |  25  |  0.97    Ca    9.4      20 Jun 2017 05:00    RADIOLOGY & ADDITIONAL TESTS:    Imaging Personally Reviewed:    Consultant(s) Notes Reviewed:      Care Discussed with Consultants/Other Providers:

## 2017-06-20 NOTE — PROGRESS NOTE BEHAVIORAL HEALTH - SUMMARY
75 yo female with mild dementia who was recently discharged from a [place] assisted living facility and now living with her cousin was found wandering a gas station.  Pleasantly confused.  No behavioral issues.  Needs placement.

## 2017-06-20 NOTE — PROGRESS NOTE ADULT - ASSESSMENT
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  Reportedly pt recently in a shelter and a medical facility in [place], found wandering, brother Lamont is in rehab after a stroke, granddaughter Libertad involved.

## 2017-06-20 NOTE — PROGRESS NOTE BEHAVIORAL HEALTH - SUMMARY
75 yo female with mild dementia who was recently discharged from a [place] assisted living facility and now living with her cousin was found wandering a gas station.  Pleasantly confused.  Had behavioral outburst yesterday (6/19) and required PRN medication.

## 2017-06-21 PROCEDURE — 99233 SBSQ HOSP IP/OBS HIGH 50: CPT

## 2017-06-21 PROCEDURE — 99232 SBSQ HOSP IP/OBS MODERATE 35: CPT

## 2017-06-21 RX ORDER — LISINOPRIL 2.5 MG/1
20 TABLET ORAL DAILY
Qty: 0 | Refills: 0 | Status: DISCONTINUED | OUTPATIENT
Start: 2017-06-22 | End: 2017-06-26

## 2017-06-21 RX ADMIN — LISINOPRIL 10 MILLIGRAM(S): 2.5 TABLET ORAL at 05:27

## 2017-06-21 RX ADMIN — ATORVASTATIN CALCIUM 40 MILLIGRAM(S): 80 TABLET, FILM COATED ORAL at 21:16

## 2017-06-21 RX ADMIN — ENOXAPARIN SODIUM 40 MILLIGRAM(S): 100 INJECTION SUBCUTANEOUS at 21:17

## 2017-06-21 NOTE — PROGRESS NOTE BEHAVIORAL HEALTH - NSBHCHARTREVIEWLAB_PSY_A_CORE FT
Complete Blood Count + Automated Diff in AM (06.17.17 @ 06:30)    WBC Count: 5.59 K/uL    RBC Count: 4.19 M/uL    Hemoglobin: 12.4 g/dL    Hematocrit: 38.6 %    Mean Cell Volume: 92.1 fL    Mean Cell Hemoglobin: 29.6 pg    Mean Cell Hemoglobin Conc: 32.1 %    Red Cell Distrib Width: 13.0 %    Platelet Count - Automated: 239 K/uL    MPV: 9.6 fl    Auto Neutrophil #: 2.09 K/uL    Auto Lymphocyte #: 3.01 K/uL    Auto Monocyte #: 0.41 K/uL    Auto Eosinophil #: 0.05 K/uL    Auto Basophil #: 0.02 K/uL    Auto Neutrophil %: 37.4 %    Auto Lymphocyte %: 53.8 %    Auto Monocyte %: 7.3 %    Auto Eosinophil %: 0.9 %    Auto Basophil %: 0.4 %    Auto Immature Granulocyte %: 0.2: (Includes meta, myelo and promyelocytes) %    Basic Metabolic Panel in AM (06.17.17 @ 06:30)    Sodium, Serum: 141 mmol/L    Potassium, Serum: 4.3 mmol/L    Chloride, Serum: 104 mmol/L    Carbon Dioxide, Serum: 23 mmol/L    Blood Urea Nitrogen, Serum: 18 mg/dL    Creatinine, Serum: 0.86 mg/dL    Glucose, Serum: 75 mg/dL    Calcium, Total Serum: 9.1 mg/dL    eGFR if Non : 66    eGFR if : 77 mL/min    CK Total and CKMB (06.17.17 @ 06:30)    Creatine Kinase, Serum: 538    CKMB: 5.69 ng/mL    CKMB Relative Index: 1.1
12.2   4.44  )-----------( 237      ( 20 Jun 2017 05:00 )             37.2   06-20    141  |  104  |  20  ----------------------------<  77  4.2   |  25  |  0.97    Ca    9.4      20 Jun 2017 05:00

## 2017-06-21 NOTE — PROGRESS NOTE ADULT - PROBLEM SELECTOR PLAN 2
continue with lisinopril and metoprolol  monitor BP, HR continue with metoprolol, will increase lisinopril to 20 as bp on high side  monitor BP, HR

## 2017-06-21 NOTE — PROGRESS NOTE ADULT - SUBJECTIVE AND OBJECTIVE BOX
CC:  dementia    SUBJECTIVE / OVERNIGHT EVENTS:  In good spirits.  No problems reported.  Pt eating/drinking well, walking around unit steady.    MEDICATIONS  (STANDING):  enoxaparin Injectable 40milliGRAM(s) SubCutaneous every 24 hours  metoprolol succinate ER 25milliGRAM(s) Oral daily  lisinopril 10milliGRAM(s) Oral daily  atorvastatin 40milliGRAM(s) Oral at bedtime    MEDICATIONS  (PRN):  haloperidol     Tablet 1milliGRAM(s) Oral every 6 hours PRN Agitation  haloperidol    Injectable 1milliGRAM(s) IV Push every 6 hours PRN Agitation  haloperidol    Injectable 1milliGRAM(s) IntraMuscular every 6 hours PRN Agitation  QUEtiapine 50milliGRAM(s) Oral every 6 hours PRN Anxiety or insomnia      Vital Signs Last 24 Hrs  T(C): 36.9, Max: 37.3 (06-20 @ 20:36)  HR: 64 (52 - 64)  BP: 139/72 (139/72 - 157/79)  RR: 18 (17 - 18)  SpO2: 100% (99% - 100%)  Wt(kg): --  CAPILLARY BLOOD GLUCOSE    PHYSICAL EXAM:  GENERAL: NAD, well-developed  HEAD:  Atraumatic, Normocephalic  EYES: EOMI, PERRLA, conjunctiva and sclera clear  NECK: Supple, No JVD  CHEST/LUNG: Clear to auscultation bilaterally; No wheeze  HEART: Regular rate and rhythm; No murmurs, rubs, or gallops  ABDOMEN: Soft, Nontender, Nondistended; Bowel sounds present  EXTREMITIES:  2+ Peripheral Pulses, No clubbing, cyanosis, or edema  PSYCH: oriented to hospital    LABS:                        12.2   4.44  )-----------( 237      ( 20 Jun 2017 05:00 )             37.2     06-20    141  |  104  |  20  ----------------------------<  77  4.2   |  25  |  0.97    Ca    9.4      20 Jun 2017 05:00    RADIOLOGY & ADDITIONAL TESTS:    Imaging Personally Reviewed:    Consultant(s) Notes Reviewed:      Care Discussed with Consultants/Other Providers: psychiatry re dementia, dispo

## 2017-06-21 NOTE — PROGRESS NOTE BEHAVIORAL HEALTH - NSBHFUPINTERVALHXFT_PSY_A_CORE
No acute events O/N.  No PRNs.  Seen and examined at bedside.  Pt calm and cooperative.  Remains disoriented to time.  Able to state she is in the hospital.  No behavioral issues.  Denies SI/HI/AH/VH.  overall she has been pretty stable not agitated or combative, pleasant and redirectable

## 2017-06-21 NOTE — PROGRESS NOTE BEHAVIORAL HEALTH - NSBHCONSULTFOLLOWDETAILS_PSY_A_CORE FT
Will need safe d/c planning and placement

## 2017-06-22 PROCEDURE — 99232 SBSQ HOSP IP/OBS MODERATE 35: CPT

## 2017-06-22 RX ADMIN — Medication 25 MILLIGRAM(S): at 05:59

## 2017-06-22 RX ADMIN — ATORVASTATIN CALCIUM 40 MILLIGRAM(S): 80 TABLET, FILM COATED ORAL at 21:53

## 2017-06-22 RX ADMIN — LISINOPRIL 20 MILLIGRAM(S): 2.5 TABLET ORAL at 06:04

## 2017-06-22 RX ADMIN — ENOXAPARIN SODIUM 40 MILLIGRAM(S): 100 INJECTION SUBCUTANEOUS at 21:53

## 2017-06-22 NOTE — DIETITIAN INITIAL EVALUATION ADULT. - OTHER INFO
Initial Dietitian Evaluation 2/2 to extended length of stay. Per staff, good PO and appetite at this time ( 100% meal intake.)  No GI distress (nausea/vomiting/diarrhea/constipation.) No reported difficulties chewing and swallowing.

## 2017-06-22 NOTE — DIETITIAN INITIAL EVALUATION ADULT. - ENERGY NEEDS
Current weight: 123.6 pounds   Height: 64"  BMI:21.1 kg/m^2  Ideal Weight: 120 pounds +/- 10%  % Ideal Weight: 103 %

## 2017-06-22 NOTE — PROGRESS NOTE BEHAVIORAL HEALTH - NSBHFUPINTERVALHXFT_PSY_A_CORE
No acute events O/N. No PRNs. No acute events O/N. No PRNs.  Seen and examined at bedside.  Pt is calm and cooperative, pleasantly confused.  Remains disoriented to time and name of hospital but doing well.  No behavioral outbursts.

## 2017-06-22 NOTE — PROGRESS NOTE ADULT - SUBJECTIVE AND OBJECTIVE BOX
Patient is a 76y old  Female who presents with a chief complaint of wandering; need NH placement.    SUBJECTIVE / OVERNIGHT EVENTS:  No problems reported over night.  Pt says she's doing fine, wondering when she can get out of here.  Denies pain, SOB.  Eating/drinking ok.    MEDICATIONS  (STANDING):  enoxaparin Injectable 40milliGRAM(s) SubCutaneous every 24 hours  metoprolol succinate ER 25milliGRAM(s) Oral daily  atorvastatin 40milliGRAM(s) Oral at bedtime  lisinopril 20milliGRAM(s) Oral daily    MEDICATIONS  (PRN):  haloperidol     Tablet 1milliGRAM(s) Oral every 6 hours PRN Agitation  haloperidol    Injectable 1milliGRAM(s) IV Push every 6 hours PRN Agitation  haloperidol    Injectable 1milliGRAM(s) IntraMuscular every 6 hours PRN Agitation  QUEtiapine 50milliGRAM(s) Oral every 6 hours PRN Anxiety or insomnia    Vital Signs Last 24 Hrs  T(C): 36.9, Max: 37.1 (06-21 @ 20:47)  HR: 53 (53 - 71)  BP: 147/98 (121/67 - 147/98)  RR: 18 (18 - 18)  SpO2: 99% (99% - 100%)  Wt(kg): --  CAPILLARY BLOOD GLUCOSE    PHYSICAL EXAM:  GENERAL: NAD, well-developed  HEAD:  Atraumatic, Normocephalic  EYES: EOMI, PERRLA, conjunctiva and sclera clear  NECK: Supple, No JVD  CHEST/LUNG: Clear to auscultation bilaterally; No wheeze  HEART: Regular rate and rhythm; No murmurs, rubs, or gallops  ABDOMEN: Soft, Nontender, Nondistended; Bowel sounds present  EXTREMITIES:  2+ Peripheral Pulses, No clubbing, cyanosis, or edema  PSYCH: calm, oriented hospital      LABS:    RADIOLOGY & ADDITIONAL TESTS:    Imaging Personally Reviewed:    Consultant(s) Notes Reviewed:      Care Discussed with Consultants/Other Providers:

## 2017-06-22 NOTE — PROGRESS NOTE BEHAVIORAL HEALTH - NSBHCONSULTRECOMMENDOTHER_PSY_A_CORE FT
1 - Cannot leave AMA  2 - Cannot make medical decisions  3 - Continue PRNs.  No standing medications.  4 - Will follow.  Will need safe d/c planning 1 - Cannot leave AMA  2 - Cannot make medical decisions  3 - Continue PRNs.  No standing medications.  4 - Psychiatrically stable for discharge.  Will need safe placement and d/c planning

## 2017-06-22 NOTE — DIETITIAN INITIAL EVALUATION ADULT. - NS AS NUTRI INTERV STRATEGIES
1- Continue current diet order, which remains appropriate at this time. 2- Monitor weights, labs, BM's, skin integrity, p.o. intake. 3- Multivitamin 4- RD remains available, re-consult as needed. Tran Lya RD Pager #[phone]

## 2017-06-22 NOTE — PROGRESS NOTE BEHAVIORAL HEALTH - SUMMARY
75 yo female with mild dementia who was recently discharged from a [place] assisted living facility and now living with her cousin was found wandering a gas station.  Last PRN received 6/19. 75 yo female with mild dementia who was recently discharged from a [place] assisted living facility and now living with her cousin was found wandering a gas station.  Last PRN received 6/19.  Pt maintaining behavioral control at this time.

## 2017-06-23 PROCEDURE — 99232 SBSQ HOSP IP/OBS MODERATE 35: CPT

## 2017-06-23 RX ADMIN — Medication 25 MILLIGRAM(S): at 06:23

## 2017-06-23 RX ADMIN — LISINOPRIL 20 MILLIGRAM(S): 2.5 TABLET ORAL at 06:23

## 2017-06-23 RX ADMIN — ATORVASTATIN CALCIUM 40 MILLIGRAM(S): 80 TABLET, FILM COATED ORAL at 22:08

## 2017-06-23 RX ADMIN — ENOXAPARIN SODIUM 40 MILLIGRAM(S): 100 INJECTION SUBCUTANEOUS at 22:08

## 2017-06-23 NOTE — PROGRESS NOTE BEHAVIORAL HEALTH - NSBHCHARTREVIEWVS_PSY_A_CORE FT
Vital Signs Last 24 Hrs  T(C): 36.4, Max: 36.8 (06-18 @ 20:23)  T(F): 97.6, Max: 98.2 (06-18 @ 20:23)  HR: 65 (53 - 65)  BP: 160/79 (129/75 - 160/79)  BP(mean): --  RR: 17 (16 - 18)  SpO2: 100% (98% - 100%)
Vital Signs Last 24 Hrs  T(C): 36.4, Max: 36.8 (06-18 @ 20:23)  T(F): 97.6, Max: 98.2 (06-18 @ 20:23)  HR: 65 (53 - 65)  BP: 160/79 (129/75 - 160/79)  BP(mean): --  RR: 17 (16 - 18)  SpO2: 100% (98% - 100%)
Vital Signs Last 24 Hrs  T(C): 36.9, Max: 37.1 (06-21 @ 20:47)  T(F): 98.5, Max: 98.8 (06-21 @ 20:47)  HR: 53 (53 - 71)  BP: 147/98 (121/67 - 147/98)  BP(mean): --  RR: 18 (18 - 18)  SpO2: 99% (99% - 100%)
Vital Signs Last 24 Hrs  T(C): 36.4, Max: 36.8 (06-18 @ 20:23)  T(F): 97.6, Max: 98.2 (06-18 @ 20:23)  HR: 65 (53 - 65)  BP: 160/79 (129/75 - 160/79)  BP(mean): --  RR: 17 (16 - 18)  SpO2: 100% (98% - 100%)
Vital Signs Last 24 Hrs  T(C): 36.9, Max: 37.1 (06-21 @ 20:47)  T(F): 98.5, Max: 98.8 (06-21 @ 20:47)  HR: 53 (53 - 71)  BP: 147/98 (121/67 - 147/98)  BP(mean): --  RR: 18 (18 - 18)  SpO2: 99% (99% - 100%)
Vital Signs Last 24 Hrs  T(C): 37.1, Max: 37.1 (06-20 @ 13:02)  T(F): 98.7, Max: 98.7 (06-20 @ 13:02)  HR: 67 (52 - 67)  BP: 154/96 (133/72 - 154/96)  BP(mean): --  RR: 18 (17 - 18)  SpO2: 100% (100% - 100%)

## 2017-06-23 NOTE — PROGRESS NOTE BEHAVIORAL HEALTH - NSBHCONSULTMEDSLEEP_PSY_A_CORE FT
Seroquel 50mg PO

## 2017-06-23 NOTE — PROGRESS NOTE BEHAVIORAL HEALTH - NSBHFUPTYPE_PSY_A_CORE
Consult Follow Up

## 2017-06-23 NOTE — PROGRESS NOTE BEHAVIORAL HEALTH - NSBHCONSULTMEDANXIETY_PSY_A_CORE FT
Seroquel 50mg PO q6h

## 2017-06-23 NOTE — PROGRESS NOTE BEHAVIORAL HEALTH - NSBHFUPINTERVALHXFT_PSY_A_CORE
No acute events O/N. No PRNs.  Seen and examined at bedside.  Pt is calm and cooperative, pleasantly confused.  Remains disoriented to time and name of hospital but doing well.  No behavioral outbursts. today she was sitting up in the chair cooperative not combative and slept well

## 2017-06-23 NOTE — PROGRESS NOTE BEHAVIORAL HEALTH - RISK ASSESSMENT
Low.  Denies SI/HI.

## 2017-06-23 NOTE — PROGRESS NOTE BEHAVIORAL HEALTH - NSBHCONSULTOBS_PSY_A_CORE
Routine observation

## 2017-06-23 NOTE — PROGRESS NOTE BEHAVIORAL HEALTH - NSBHATTESTSEENBY_PSY_A_CORE
attending Psychiatrist without NP/Trainee
attending Psychiatrist without NP/Trainee
Attending Psychiatrist supervising NP/Trainee, meeting pt...
Attending Psychiatrist supervising NP/Trainee, meeting pt...
NP without Attending Psychiatrist
attending Psychiatrist without NP/Trainee

## 2017-06-23 NOTE — PROGRESS NOTE BEHAVIORAL HEALTH - SUMMARY
77 yo female with mild dementia who was recently discharged from a [place] assisted living facility and now living with her cousin was found wandering a gas station.  Last PRN received 6/19.  Pt maintaining behavioral control at this time.

## 2017-06-23 NOTE — PROGRESS NOTE BEHAVIORAL HEALTH - NSBHCONSULTRECOMMENDOTHER_PSY_A_CORE FT
1 - Cannot leave AMA  2 - Cannot make medical decisions  3 - Continue PRNs.  No standing medications.  4 - Psychiatrically stable for discharge.  Will need safe placement and d/c planning

## 2017-06-23 NOTE — PROGRESS NOTE BEHAVIORAL HEALTH - NSBHCONSULTMEDAGITATION_PSY_A_CORE FT
Haldol 1mg PO/IV/IM q6h

## 2017-06-23 NOTE — PROGRESS NOTE BEHAVIORAL HEALTH - NSBHCONSULTMEDPRNREASON_PSY_A_CORE
agitation.../sleep.../anxiety...
agitation.../sleep.../anxiety...
anxiety.../agitation.../sleep...
sleep.../agitation.../anxiety...
agitation.../anxiety.../sleep...
agitation.../sleep.../anxiety...

## 2017-06-23 NOTE — PROGRESS NOTE ADULT - SUBJECTIVE AND OBJECTIVE BOX
Patient is a 76y old  Female who presents with a chief complaint of wandering; need NH placement. (15 Enoch 2017 15:08)      SUBJECTIVE / OVERNIGHT EVENTS:  Doing ok, frustrated she's still here.    MEDICATIONS  (STANDING):  enoxaparin Injectable 40milliGRAM(s) SubCutaneous every 24 hours  metoprolol succinate ER 25milliGRAM(s) Oral daily  atorvastatin 40milliGRAM(s) Oral at bedtime  lisinopril 20milliGRAM(s) Oral daily    MEDICATIONS  (PRN):  haloperidol     Tablet 1milliGRAM(s) Oral every 6 hours PRN Agitation  haloperidol    Injectable 1milliGRAM(s) IV Push every 6 hours PRN Agitation  haloperidol    Injectable 1milliGRAM(s) IntraMuscular every 6 hours PRN Agitation  QUEtiapine 50milliGRAM(s) Oral every 6 hours PRN Anxiety or insomnia    Vital Signs Last 24 Hrs  T(C): 36.7, Max: 37.3 (06-22 @ 14:52)  HR: 62 (50 - 62)  BP: 139/75 (124/67 - 153/79)  RR: 16 (16 - 18)  SpO2: 100% (100% - 100%)  Wt(kg): --  CAPILLARY BLOOD GLUCOSE    PHYSICAL EXAM:  GENERAL: NAD, well-developed  HEAD:  Atraumatic, Normocephalic  EYES: EOMI, PERRLA, conjunctiva and sclera clear  NECK: Supple, No JVD  CHEST/LUNG: Clear to auscultation bilaterally; No wheeze  HEART: Regular rate and rhythm; No murmurs, rubs, or gallops  ABDOMEN: Soft, Nontender, Nondistended; Bowel sounds present  EXTREMITIES:  2+ Peripheral Pulses, No clubbing, cyanosis, or edema  PSYCH: calm  NEUROLOGY: non-focal  SKIN: No rashes or lesions    LABS:    RADIOLOGY & ADDITIONAL TESTS:    Imaging Personally Reviewed:    Consultant(s) Notes Reviewed:      Care Discussed with Consultants/Other Providers:

## 2017-06-23 NOTE — PROGRESS NOTE BEHAVIORAL HEALTH - PRIMARY DX
Dementia with behavioral disturbance, unspecified dementia type

## 2017-06-23 NOTE — PROGRESS NOTE BEHAVIORAL HEALTH - NSBHCONSFOLLOWNEEDS_PSY_A_CORE
Patient needs further psychiatric safety assessment prior to discharge
no psychiatric contraindications to discharge
Patient needs further psychiatric safety assessment prior to discharge
no psychiatric contraindications to discharge
Patient needs further psychiatric safety assessment prior to discharge
Patient needs further psychiatric safety assessment prior to discharge

## 2017-06-24 PROCEDURE — 99232 SBSQ HOSP IP/OBS MODERATE 35: CPT

## 2017-06-24 RX ADMIN — LISINOPRIL 20 MILLIGRAM(S): 2.5 TABLET ORAL at 05:22

## 2017-06-24 RX ADMIN — ATORVASTATIN CALCIUM 40 MILLIGRAM(S): 80 TABLET, FILM COATED ORAL at 21:26

## 2017-06-24 RX ADMIN — Medication 25 MILLIGRAM(S): at 05:22

## 2017-06-24 RX ADMIN — ENOXAPARIN SODIUM 40 MILLIGRAM(S): 100 INJECTION SUBCUTANEOUS at 21:26

## 2017-06-24 NOTE — PROGRESS NOTE ADULT - SUBJECTIVE AND OBJECTIVE BOX
Patient is a 76y old  Female who presents with a chief complaint of wandering a gas; need NH placement. (15 Enoch 2017 15:08)      SUBJECTIVE / OVERNIGHT EVENTS:  No active issues overnight. No cp, SOB abdominal pain, N/V/D    MEDICATIONS  (STANDING):  enoxaparin Injectable 40milliGRAM(s) SubCutaneous every 24 hours  metoprolol succinate ER 25milliGRAM(s) Oral daily  atorvastatin 40milliGRAM(s) Oral at bedtime  lisinopril 20milliGRAM(s) Oral daily    MEDICATIONS  (PRN):  haloperidol     Tablet 1milliGRAM(s) Oral every 6 hours PRN Agitation  haloperidol    Injectable 1milliGRAM(s) IV Push every 6 hours PRN Agitation  haloperidol    Injectable 1milliGRAM(s) IntraMuscular every 6 hours PRN Agitation  QUEtiapine 50milliGRAM(s) Oral every 6 hours PRN Anxiety or insomnia      Vital Signs Last 24 Hrs  T(C): 36.7, Max: 36.7 (06-23 @ 13:13)  HR: 52 (52 - 70)  BP: 140/67 (139/75 - 181/83)  RR: 18 (16 - 18)  SpO2: 100% (100% - 100%)  Wt(kg): --  CAPILLARY BLOOD GLUCOSE    I&O's Summary      PHYSICAL EXAM:  GENERAL: NAD, well-developed  HEAD:  Atraumatic, Normocephalic  EYES: EOMI, PERRLA, conjunctiva and sclera clear  NECK: Supple, No JVD  CHEST/LUNG: Clear to auscultation bilaterally; No wheeze  HEART: Regular rate and rhythm; No murmurs, rubs, or gallops  ABDOMEN: Soft, Nontender, Nondistended; Bowel sounds present  EXTREMITIES:  2+ Peripheral Pulses, No clubbing, cyanosis, or edema  PSYCH: AAOx3  NEUROLOGY: non-focal  SKIN: No rashes or lesions    LABS:                    RADIOLOGY & ADDITIONAL TESTS:    Imaging Personally Reviewed:    Consultant(s) Notes Reviewed:      Care Discussed with Consultants/Other Providers:

## 2017-06-25 PROCEDURE — 99232 SBSQ HOSP IP/OBS MODERATE 35: CPT

## 2017-06-25 RX ORDER — METOPROLOL TARTRATE 50 MG
25 TABLET ORAL DAILY
Qty: 0 | Refills: 0 | Status: DISCONTINUED | OUTPATIENT
Start: 2017-06-25 | End: 2017-06-26

## 2017-06-25 RX ADMIN — ENOXAPARIN SODIUM 40 MILLIGRAM(S): 100 INJECTION SUBCUTANEOUS at 21:06

## 2017-06-25 RX ADMIN — ATORVASTATIN CALCIUM 40 MILLIGRAM(S): 80 TABLET, FILM COATED ORAL at 21:06

## 2017-06-25 RX ADMIN — LISINOPRIL 20 MILLIGRAM(S): 2.5 TABLET ORAL at 05:31

## 2017-06-25 NOTE — PROGRESS NOTE ADULT - SUBJECTIVE AND OBJECTIVE BOX
Patient is a 76y old  Female who presents with a chief complaint of wandering a gas; need NH placement. (15 Enoch 2017 15:08)      SUBJECTIVE / OVERNIGHT EVENTS:  No active issues overnight. No cp or SOB    MEDICATIONS  (STANDING):  enoxaparin Injectable 40milliGRAM(s) SubCutaneous every 24 hours  metoprolol succinate ER 25milliGRAM(s) Oral daily  atorvastatin 40milliGRAM(s) Oral at bedtime  lisinopril 20milliGRAM(s) Oral daily    MEDICATIONS  (PRN):  haloperidol     Tablet 1milliGRAM(s) Oral every 6 hours PRN Agitation  haloperidol    Injectable 1milliGRAM(s) IV Push every 6 hours PRN Agitation  haloperidol    Injectable 1milliGRAM(s) IntraMuscular every 6 hours PRN Agitation  QUEtiapine 50milliGRAM(s) Oral every 6 hours PRN Anxiety or insomnia      Vital Signs Last 24 Hrs  T(C): 36.9, Max: 37.2 (06-25 @ 05:33)  HR: 62 (51 - 62)  BP: 145/77 (145/77 - 174/83)  RR: 15 (15 - 18)  SpO2: 100% (99% - 100%)  Wt(kg): --  CAPILLARY BLOOD GLUCOSE    I&O's Summary      PHYSICAL EXAM:  GENERAL: NAD, well-developed  HEAD:  Atraumatic, Normocephalic  EYES: EOMI, PERRLA, conjunctiva and sclera clear  NECK: Supple, No JVD  CHEST/LUNG: Clear to auscultation bilaterally; No wheeze  HEART: Regular rate and rhythm; No murmurs, rubs, or gallops  ABDOMEN: Soft, Nontender, Nondistended; Bowel sounds present  EXTREMITIES:  2+ Peripheral Pulses, No clubbing, cyanosis, or edema  PSYCH: AAOx3. calm  NEUROLOGY: non-focal  SKIN: No rashes or lesions    LABS:                    RADIOLOGY & ADDITIONAL TESTS:    Imaging Personally Reviewed:    Consultant(s) Notes Reviewed:      Care Discussed with Consultants/Other Providers:

## 2017-06-26 VITALS
SYSTOLIC BLOOD PRESSURE: 131 MMHG | HEART RATE: 64 BPM | OXYGEN SATURATION: 100 % | RESPIRATION RATE: 18 BRPM | TEMPERATURE: 98 F | DIASTOLIC BLOOD PRESSURE: 57 MMHG

## 2017-06-26 LAB
BASOPHILS # BLD AUTO: 0.02 K/UL — SIGNIFICANT CHANGE UP (ref 0–0.2)
BASOPHILS NFR BLD AUTO: 0.4 % — SIGNIFICANT CHANGE UP (ref 0–2)
BUN SERPL-MCNC: 19 MG/DL — SIGNIFICANT CHANGE UP (ref 7–23)
CALCIUM SERPL-MCNC: 9.1 MG/DL — SIGNIFICANT CHANGE UP (ref 8.4–10.5)
CHLORIDE SERPL-SCNC: 106 MMOL/L — SIGNIFICANT CHANGE UP (ref 98–107)
CO2 SERPL-SCNC: 24 MMOL/L — SIGNIFICANT CHANGE UP (ref 22–31)
CREAT SERPL-MCNC: 0.94 MG/DL — SIGNIFICANT CHANGE UP (ref 0.5–1.3)
EOSINOPHIL # BLD AUTO: 0.03 K/UL — SIGNIFICANT CHANGE UP (ref 0–0.5)
EOSINOPHIL NFR BLD AUTO: 0.6 % — SIGNIFICANT CHANGE UP (ref 0–6)
GLUCOSE SERPL-MCNC: 72 MG/DL — SIGNIFICANT CHANGE UP (ref 70–99)
HCT VFR BLD CALC: 39.2 % — SIGNIFICANT CHANGE UP (ref 34.5–45)
HGB BLD-MCNC: 12.5 G/DL — SIGNIFICANT CHANGE UP (ref 11.5–15.5)
IMM GRANULOCYTES NFR BLD AUTO: 0 % — SIGNIFICANT CHANGE UP (ref 0–1.5)
LYMPHOCYTES # BLD AUTO: 3.01 K/UL — SIGNIFICANT CHANGE UP (ref 1–3.3)
LYMPHOCYTES # BLD AUTO: 58.2 % — HIGH (ref 13–44)
MCHC RBC-ENTMCNC: 29.3 PG — SIGNIFICANT CHANGE UP (ref 27–34)
MCHC RBC-ENTMCNC: 31.9 % — LOW (ref 32–36)
MCV RBC AUTO: 91.8 FL — SIGNIFICANT CHANGE UP (ref 80–100)
MONOCYTES # BLD AUTO: 0.4 K/UL — SIGNIFICANT CHANGE UP (ref 0–0.9)
MONOCYTES NFR BLD AUTO: 7.7 % — SIGNIFICANT CHANGE UP (ref 2–14)
NEUTROPHILS # BLD AUTO: 1.71 K/UL — LOW (ref 1.8–7.4)
NEUTROPHILS NFR BLD AUTO: 33.1 % — LOW (ref 43–77)
PLATELET # BLD AUTO: 223 K/UL — SIGNIFICANT CHANGE UP (ref 150–400)
PMV BLD: 9.4 FL — SIGNIFICANT CHANGE UP (ref 7–13)
POTASSIUM SERPL-MCNC: 4.4 MMOL/L — SIGNIFICANT CHANGE UP (ref 3.5–5.3)
POTASSIUM SERPL-SCNC: 4.4 MMOL/L — SIGNIFICANT CHANGE UP (ref 3.5–5.3)
RBC # BLD: 4.27 M/UL — SIGNIFICANT CHANGE UP (ref 3.8–5.2)
RBC # FLD: 13.2 % — SIGNIFICANT CHANGE UP (ref 10.3–14.5)
SODIUM SERPL-SCNC: 144 MMOL/L — SIGNIFICANT CHANGE UP (ref 135–145)
WBC # BLD: 5.17 K/UL — SIGNIFICANT CHANGE UP (ref 3.8–10.5)
WBC # FLD AUTO: 5.17 K/UL — SIGNIFICANT CHANGE UP (ref 3.8–10.5)

## 2017-06-26 PROCEDURE — 99239 HOSP IP/OBS DSCHRG MGMT >30: CPT

## 2017-06-26 RX ORDER — LISINOPRIL 2.5 MG/1
1 TABLET ORAL
Qty: 0 | Refills: 0 | COMMUNITY

## 2017-06-26 RX ORDER — LISINOPRIL 2.5 MG/1
1 TABLET ORAL
Qty: 0 | Refills: 0 | COMMUNITY
Start: 2017-06-26

## 2017-06-26 RX ADMIN — LISINOPRIL 20 MILLIGRAM(S): 2.5 TABLET ORAL at 05:22

## 2017-06-26 RX ADMIN — Medication 25 MILLIGRAM(S): at 11:03

## 2017-06-26 NOTE — DISCHARGE NOTE ADULT - MEDICATION SUMMARY - MEDICATIONS TO TAKE
I will START or STAY ON the medications listed below when I get home from the hospital:    lisinopril 20 mg oral tablet  -- 1 tab(s) by mouth once a day  -- Indication: For HTN (hypertension)    atorvastatin 40 mg oral tablet  -- 1 tab(s) by mouth once a day  -- Indication: For HLD    metoprolol succinate 25 mg oral tablet, extended release  -- 1 tab(s) by mouth once a day  -- Indication: For HTN (hypertension)

## 2017-06-26 NOTE — DISCHARGE NOTE ADULT - PATIENT PORTAL LINK FT
“You can access the Follow[facility] Patient Portal, offered by [facility], by registering with the following website: [email or web address]”

## 2017-06-26 NOTE — DISCHARGE NOTE ADULT - MEDICATION SUMMARY - MEDICATIONS TO CHANGE
I will SWITCH the dose or number of times a day I take the medications listed below when I get home from the hospital:    lisinopril 10 mg oral tablet  -- 1 tab(s) by mouth once a day

## 2017-06-26 NOTE — DISCHARGE NOTE ADULT - PLAN OF CARE
Followup with PMD and take all medications prescribed. Followup with PMD and take all medications prescribed. Low salt, low fat, low cholesterol diet Follow up with PMD and take all medications prescribed. Follow up with PMD and take all medications prescribed. Low salt, low fat, low cholesterol diet

## 2017-06-26 NOTE — DISCHARGE NOTE ADULT - COMMUNITY RESOURCES
[facility] [address] [place] [id number]; [phone] via [facility] [phone]

## 2017-06-26 NOTE — DISCHARGE NOTE ADULT - HOSPITAL COURSE
76 y.o. Female w/ hx mild dementia who was recently discharged from a [facility] facility (for unknown reason) and now living with her cousin "Janel" was found wandering a gas station.   Uncontrolled hypertension  -lisinopril, toprol XL, lipitor     Dementia with behavioral disturbance  -Needs NH placement since brother or cousin can't take care of her.   -SW and case management consult   -CT head- negative  -patient will need placement  -patient cannot make any medical decisions or discharge planning  -haldol 1mg prn Q6 hour PO/IM/IV    6/26- As per attending, patient stable for discharge.

## 2017-06-26 NOTE — CHART NOTE - NSCHARTNOTEFT_GEN_A_CORE
Pt is a 75 yo female with ho dementia a/w metabolic enceph due to dementia, unable to live alone.  Pt stable for d/c to [facility]  35 min spent with d/c planning

## 2017-06-26 NOTE — DISCHARGE NOTE ADULT - CARE PLAN
Principal Discharge DX:	Dementia with behavioral disturbance, unspecified dementia type  Goal:	Followup with PMD and take all medications prescribed.  Instructions for follow-up, activity and diet:	Followup with PMD and take all medications prescribed. Low salt, low fat, low cholesterol diet  Secondary Diagnosis:	Essential hypertension  Goal:	Followup with PMD and take all medications prescribed.  Instructions for follow-up, activity and diet:	Followup with PMD and take all medications prescribed. Low salt, low fat, low cholesterol diet Principal Discharge DX:	Dementia with behavioral disturbance, unspecified dementia type  Goal:	Follow up with PMD and take all medications prescribed.  Instructions for follow-up, activity and diet:	Follow up with PMD and take all medications prescribed. Low salt, low fat, low cholesterol diet  Secondary Diagnosis:	Essential hypertension  Goal:	Follow up with PMD and take all medications prescribed.  Instructions for follow-up, activity and diet:	Follow up with PMD and take all medications prescribed. Low salt, low fat, low cholesterol diet

## 2017-07-20 NOTE — ED PROVIDER NOTE - CADM POA PRESS ULCER
Is This A New Presentation, Or A Follow-Up?: Psoriasis
Additional History: Patient states he was seeing Dr. cochran and they prescribed him Clobetasol cream and he only applied it as needed. Patient states it did help but states there is to much coverage to apply everywhere. Patient states he was doing the light therapy and states it has gotten him pretty clear about five years ago but did light therapy recently and states it didn't do much.
No

## 2018-01-05 NOTE — PROGRESS NOTE BEHAVIORAL HEALTH - [facility] ED [facility] MED ROS RESPIRATORY
Assessment/Plan





- Problem List


(1) Acute respiratory failure with hypoxia and hypercapnia


Assessment/Plan: 


Pt continues to need BIPAP she is arousable but only opens eyes and follows 

simple commands. 


ABG this morning shows PCO2 of 73 with pH of 7.23 


She continues to have random desaturations down to 50-70% O2 on 35% BiPAP from 

likely mucus plugs 


She is on lasix 120 mg TID and diuresing well but not making much progress with 

her respiratory status


She needs sedation in order to get oxygenation, otherwise she refuses O2 and 

all her care.


I discussed the management with her . Attempt was made yesterday to 

transfer to a hospital with higher level of care, as she will need > 1week for 

diuresis to improve respiratory status and would benefit from specialists: 

Pulmonologist, Cardiologist, Endocrine and possibly Psychiatrist (given her 

personality and outbursts).


The following hospitals were called yesterday for transfer to an Intensivist 

and none had ICU beds open: [facility], [facility], [facility], 

[facility], formerly [facility] (but not [facility]), [facility] in [place] and [facility] 

in [place].


Continue BIPAP management and gentle sedation vs Haldol or Zaprexa as well as 

wrist restraints for safety.


We will repeat ABG and adjust BiPAP settings


Etiology is likely multifactorial from diastolic heart failure with severe 

pulmonary edema and anasarca, likely pulmonary hypertension from ZULY/OHS, COPD 

as she is a heavy smoker and possible PEs as patient has been sedentary for 

several weeks. We cannot do a CTPE as patients crt is elevated.








(2) Anasarca


Assessment/Plan: 


Continue diuresis


Watch BUN/creat and Mg, electrolytes.


Follow daily weights








(3) Morbid obesity


Assessment/Plan: 


NPO


LIkely has OHS/ZULY


On Bipap.


All po meds changed to iv


iv Pepcid started for PUD prophylaxis.








(4) Diabetes


Assessment/Plan: 


iv D5 needed again today for low glu, as she is now npo


Continue plan


Unknown etiology





(5) Acute Kidney Injury


Assessment/Plan: 


Likely secondary to cardiorenal syndrome


Patients crt is elevated at 1.8 increased from 1.5


Will continue diuresis and monitor








- Current Meds


Current Meds: 





 Current Medications











Generic Name Dose Route Start Last Admin





  Trade Name Freq  PRN Reason Stop Dose Admin


 


Albuterol/Ipratropium  3 ml  01/02/18 11:00  01/05/18 11:01





  Duoneb  INH   3 ml





  RTQID PILY   Administration


 


Enoxaparin Sodium  100 mg  01/03/18 21:00  01/05/18 09:44





  Lovenox  SUBQ   100 mg





  BID PILY   Administration


 


Furosemide  120 mg  01/03/18 11:46  01/05/18 14:25





  Lasix Inj 40 Mg Vial  IVP   120 mg





  TID PILY   Administration


 


Famotidine  50 mls @ 100 mls/hr  01/03/18 09:00  01/05/18 10:15





  Pepcid 20 Mg/50 Ml  IV   Infused





  BID PILY   Infusion


 


Piperacillin Sod/Tazobactam  100 mls @ 200 mls/hr  01/05/18 10:00  01/05/18 10:

35





  Sod 3.375 gm/ Sodium Chloride  IV   Infused





  Q6H PILY   Infusion


 


Vancomycin/Sodium Chloride  2 gm in 500 mls @ 250 mls/hr  01/05/18 11:00  01/05/ 18 14:05





  Vanco/Sod Chloride 0.9%  IV   Infused





  Q24H PILY   Infusion


 


Propofol  100 mls @ 7.484 mls/hr  01/05/18 14:00  01/05/18 16:05





  Diprivan  IV   29.85 mcg/kg/min





  TITR PILY   44.69 mls/hr





  Protocol   Titration





  5 MCG/KG/MIN   


 


Lorazepam  0.5 mg  01/03/18 18:04  01/05/18 14:32





  Ativan Inj (Vial)  IVP   0.5 mg





  Q3H PRN   Administration





  Anxiety   


 


Methylprednisolone  40 mg  01/05/18 10:00  01/05/18 15:12





  Solu-Medrol (40mg Vial)  IVP   40 mg





  TID PILY   Administration


 


Metoprolol Tartrate  5 mg  01/05/18 06:05  01/05/18 12:18





  Lopressor Inj  IVP   5 mg





  Q6H PRN   Administration





  Hypertensive Emergency   


 


Morphine Sulfate  2 mg  01/01/18 17:09  01/05/18 14:40





  Morphine  IVP   2 mg





  Q2H PRN   Administration





  Pain 8 to 10   


 


Sodium Chloride  10 ml  01/01/18 17:09  01/05/18 11:23





  Normal Saline Flush 0.9%  IVP   10 ml





  PRN PRN   Administration





  AS NEEDED PER PROVIDER ORDERS   


 


Sodium Chloride  10 ml  01/01/18 22:00  01/05/18 14:25





  Normal Saline Flush 0.9%  IVP   10 ml





  Q8HR PILY   Administration














- Lab Result


Lab results reviewed: Yes


Fish Bone Diagrams: 


 01/05/18 04:10





 01/05/18 04:10





- Diagnostic Imaging Results


Diagnostic Imaging Results: Final report reviewed





- Additional Planning


Condition/Complexity: Critical


My Orders: 





My Active Orders





01/05/18 09:22


Electrolyte [Initiate ICU Electrolyte Prot.] [RC] QSHIFT 





01/05/18 10:00


Piperacillin/Tazobactam [Zosyn] 3.375 gm   Sodium Chloride 0.9% Minibag [Normal 

Saline 0.9% Minibag] 100 ml IV Q6H 


methylPREDNISolone SUCCINATE [SOLU-Medrol (40MG VIAL)]   40 mg IVP TID 





01/05/18 11:00


Vancomycin 2 gm/Ns 500 ml [Vanco/Sod Chloride 0.9%] 2 gm in 500 ml IV Q24H 





01/05/18 12:14


ABG [Arterial Blood Gases - RT] [RC] .ONCE 





01/05/18 13:38


Arterial Blood Gases - RT [RC] .ONCE 


Initial Ventilator Settings [RC] .ONCE 


Insert NG Tube [RC] .ONCE 


Restraints Safety Checks [RC] Q5MIN 


Restraints [RC] Q24H 


Ventilator Bundle Oral Care [RC] Q2H 


Ventilator Bundle [RC] Q8H 


Ventilator Care - ICU [RC] Q4HR 


Ipratropium/Albuterol [Duoneb]   3 ml INH RTQ4H PRN 





01/05/18 14:00


Propofol 1000 mg/100 ml [Diprivan] 100 ml IV TITR 





01/05/18 14:25


Discharge [RC] .ONCE 





01/05/18 21:00


Chlorhexidine [Peridex]   15 ml PO BID 





01/06/18 07:00


Pantoprazole [Protonix]   40 mg IVP QDAC 





01/06/18 09:00


ABG - ARTERIAL BLOOD GAS [BG] DAILY 





01/07/18 09:00


ABG - ARTERIAL BLOOD GAS [BG] DAILY 





01/08/18 09:00


ABG - ARTERIAL BLOOD GAS [BG] DAILY 





01/08/18 10:30


VANCOMYCIN TROUGH [CHEM] Timed 





01/09/18 09:00


ABG - ARTERIAL BLOOD GAS [BG] DAILY 





01/10/18 09:00


ABG - ARTERIAL BLOOD GAS [BG] DAILY 











Plan Discussed with:: Patient, Family


Time Spent: Greater than 60 minutes





Subjective





- Subjective


Patient Reports: Shortness of Breath, Other (Patient able to open eyes and 

follows commands but not answering questions. She is very sedated from meds and 

hypercapnea. She continues to be in respiratory distress.)


Nursing Reports: No Complaints





Objective


Vital Signs: 





 Vital Signs - 24 hr











  01/04/18 01/04/18 01/04/18





  18:00 18:04 18:12


 


Temperature   


 


Heart Rate  91 90


 


Heart Rate [ 91  





Monitoring   





electrodes]   


 


Respiratory 20  18





Rate   


 


Blood Pressure   


 


Blood Pressure 145/81 H  





[Right Brachial   





artery]   


 


O2 Saturation 100  














  01/04/18 01/04/18 01/04/18





  19:00 19:59 20:00


 


Temperature 94 C H  36.8 C


 


Heart Rate  91 


 


Heart Rate [ 94  91





Monitoring   





electrodes]   


 


Respiratory 20  18





Rate   


 


Blood Pressure   


 


Blood Pressure 153/83 H  160/79 H





[Right Brachial   





artery]   


 


O2 Saturation 100  100














  01/04/18 01/04/18 01/04/18





  21:00 22:00 22:03


 


Temperature  36.9 C 


 


Heart Rate   92


 


Heart Rate [ 92 90 





Monitoring   





electrodes]   


 


Respiratory 18 18 





Rate   


 


Blood Pressure   


 


Blood Pressure 163/95 H 166/87 H 





[Right Brachial   





artery]   


 


O2 Saturation 100 100 














  01/04/18 01/05/18 01/05/18





  23:00 00:00 00:08


 


Temperature 36.9 C 36.8 C 


 


Heart Rate   91


 


Heart Rate [ 92 92 





Monitoring   





electrodes]   


 


Respiratory 20 18 





Rate   


 


Blood Pressure   


 


Blood Pressure 156/82 H 165/94 H 





[Right Brachial   





artery]   


 


O2 Saturation 100 99 














  01/05/18 01/05/18 01/05/18





  01:00 02:00 02:07


 


Temperature  37.0 C 


 


Heart Rate   91


 


Heart Rate [ 100 92 





Monitoring   





electrodes]   


 


Respiratory 12 18 





Rate   


 


Blood Pressure   


 


Blood Pressure 164/84 H 162/86 H 





[Right Brachial   





artery]   


 


O2 Saturation 95 100 














  01/05/18 01/05/18 01/05/18





  03:00 04:00 04:03


 


Temperature 36.9 C 37.0 C 


 


Heart Rate   93


 


Heart Rate [ 91 95 





Monitoring   





electrodes]   


 


Respiratory 18 13 





Rate   


 


Blood Pressure   


 


Blood Pressure 162/90 H 164/100 H 





[Right Brachial   





artery]   


 


O2 Saturation 100 100 














  01/05/18 01/05/18 01/05/18





  05:00 05:51 06:00


 


Temperature 37.1 C  37.1 C


 


Heart Rate  95 


 


Heart Rate [ 95  95





Monitoring   





electrodes]   


 


Respiratory 18  18





Rate   


 


Blood Pressure   


 


Blood Pressure 172/77 H  173/98 H





[Right Brachial   





artery]   


 


O2 Saturation 100  99














  01/05/18 01/05/18 01/05/18





  06:34 07:00 07:15


 


Temperature   


 


Heart Rate   80


 


Heart Rate [  80 





Monitoring   





electrodes]   


 


Respiratory  18 18





Rate   


 


Blood Pressure 173/98 H  


 


Blood Pressure  166/92 H 





[Right Brachial   





artery]   


 


O2 Saturation  100 














  01/05/18 01/05/18 01/05/18





  07:23 08:00 09:00


 


Temperature  36.8 C 


 


Heart Rate 81  


 


Heart Rate [  84 86





Monitoring   





electrodes]   


 


Respiratory  18 18





Rate   


 


Blood Pressure   


 


Blood Pressure  160/79 H 149/104 H





[Right Brachial   





artery]   


 


O2 Saturation   100














  01/05/18 01/05/18 01/05/18





  09:29 10:05 11:04


 


Temperature   


 


Heart Rate 86  82


 


Heart Rate [  86 





Monitoring   





electrodes]   


 


Respiratory  19 18





Rate   


 


Blood Pressure   


 


Blood Pressure  180/104 H 





[Right Brachial   





artery]   


 


O2 Saturation  100 














  01/05/18 01/05/18 01/05/18





  11:24 12:06 12:18


 


Temperature   


 


Heart Rate   


 


Heart Rate [ 85 85 





Monitoring   





electrodes]   


 


Respiratory 16 17 





Rate   


 


Blood Pressure   175/93 H


 


Blood Pressure 178/98 H 175/93 H 





[Right Brachial   





artery]   


 


O2 Saturation 99 100 














  01/05/18 01/05/18 01/05/18





  12:48 13:18 13:48


 


Temperature   


 


Heart Rate   74


 


Heart Rate [  78 





Monitoring   





electrodes]   


 


Respiratory  18 





Rate   


 


Blood Pressure 185/109 H  


 


Blood Pressure  187/118 H 





[Right Brachial   





artery]   


 


O2 Saturation  99 














  01/05/18 01/05/18 01/05/18





  14:21 15:12 16:00


 


Temperature   


 


Heart Rate   


 


Heart Rate [ 79 72 74





Monitoring   





electrodes]   


 


Respiratory 17 22 22





Rate   


 


Blood Pressure   


 


Blood Pressure 185/102 H 172/75 H 168/77 H





[Right Brachial   





artery]   


 


O2 Saturation 94 92 94








 Oxygen











O2 Source                      Mechanical ventilator














I&O (Last 24 Hrs): 





 Intake and Output Totals x24h











 01/03/18 01/04/18 01/05/18





 23:59 23:59 23:59


 


Intake Total 0982.115 4140 2450.088


 


Output Total 1309 6405 7515


 


Balance -209.000 -3305 -5064.912











General: Other (Sedated, follows simple commands, lethargic, opens eyes, not 

answering any questions. Morbidly obese)


HEENT: Atraumatic, PERRLA, EOMI, Other (skin breakdown on face from BIPAP)


Neck: Supple, No JVD, No thyromegaly, +2 carotid pulse wo bruit, No LAD


Lymphatic: no adenopathy


Neuro: Non Focal, Other (sedated)


Cardiovascular: Regular rate, Normal S1, Normal S2, No murmurs


Respiratory: Other (Crackles, very diffciult to ausculate secondary to body 

habitus)


Abdomen: Soft, No tenderness


Extremities: Other (Anasarca)


Comments/Notes: 





Anasarca, skin break down and edema in bilateral LEs





- Results


Results: 





 Laboratory Results











WBC  6.4 x10^3/uL (4.8-10.8)   01/05/18  04:10    


 


RBC  5.08 10^6/uL (4.20-5.40)   01/05/18  04:10    


 


Hgb  11.7 g/dL (12.0-16.0)  L  01/05/18  04:10    


 


Hct  38.9 % (37.0-47.0)   01/05/18  04:10    


 


MCV  76.4 fL (81.0-99.0)  L  01/05/18  04:10    


 


MCH  22.9 pg (27.0-31.0)  L  01/05/18  04:10    


 


MCHC  30.0 g/dL (32.0-36.0)  L  01/05/18  04:10    


 


RDW  19.3 % (12.0-15.0)  H  01/05/18  04:10    


 


Plt Count  136 10^3/uL (130-450)   01/05/18  04:10    


 


MPV  10.5 fL (7.9-10.8)   01/05/18  04:10    


 


Neut #  4.7 10^3/uL (1.5-6.6)   01/05/18  04:10    


 


Lymph #  0.9 10^3/uL (1.5-3.5)  L  01/05/18  04:10    


 


Mono #  0.7 10^3/uL (0.0-1.0)   01/05/18  04:10    


 


Eos #  0.2 10^3/uL (0.0-0.7)   01/05/18  04:10    


 


Baso #  0.0 10^3/uL (0.0-0.1)   01/05/18  04:10    


 


Absolute Nucleated RBC  0.01 x10^3/uL  01/05/18  04:10    


 


Nucleated RBC %  0.1 /100WBC  01/05/18  04:10    


 


Manual Slide Review  Indicated   01/02/18  04:45    


 


Platelet Estimate  NORMAL (130-450,000)  (NORMAL)   01/02/18  04:45    


 


Platelet Morphology  1+ LARGE PLATELETS  (NORMAL)   01/02/18  04:45    


 


RBC Morph Micro Appear  2+  ANISOCYTOSIS  (NORMAL) 1+ HYPOCHROMASIA  (NORMAL) 1

+ POLYCHROMASIA  (NORMAL) 1+ TARGET CELLS  (NORMAL)   01/02/18  04:45    


 


RBC Morph Micro Appear  2+  ANISOCYTOSIS  (NORMAL) 1+ HYPOCHROMASIA  (NORMAL) 1

+ POLYCHROMASIA  (NORMAL) 1+ TARGET CELLS  (NORMAL)   01/02/18  04:45    


 


RBC Morph Micro Appear  2+  ANISOCYTOSIS  (NORMAL) 1+ HYPOCHROMASIA  (NORMAL) 1

+ POLYCHROMASIA  (NORMAL) 1+ TARGET CELLS  (NORMAL)   01/02/18  04:45    


 


RBC Morph Micro Appear  2+  ANISOCYTOSIS  (NORMAL) 1+ HYPOCHROMASIA  (NORMAL) 1

+ POLYCHROMASIA  (NORMAL) 1+ TARGET CELLS  (NORMAL)   01/02/18  04:45    


 


Bld Gas Analysis Time  1514   01/05/18  15:04    


 


Sample Site     01/05/18  15:04    


 


ABG pH  7.36  (7.35-7.45)   01/05/18  15:04    


 


ABG pCO2  59 mmHg (34-45)  H  01/05/18  15:04    


 


ABG pO2  63 mmHg ()  L  01/05/18  15:04    


 


ABG HCO3  32.4 mmol/L (22.0-26.0)  H  01/05/18  15:04    


 


ABG Total CO2  34.3 MMOL/L (21.0-29.0)  H  01/05/18  15:04    


 


ABG O2 Saturation  93 % (94-98)  L  01/05/18  15:04    


 


ABG Oximetry Spot Check  92 %  01/05/18  15:04    


 


ABG Base Excess  5.3 mmol/L (-2.0-3.0)  H  01/05/18  15:04    


 


Rufus Test  POSITIVE   01/05/18  15:04    


 


Respiration Rate  22 b/min  01/05/18  15:04    


 


Room Air  NO   01/05/18  15:04    


 


O2 Delivery Device  VENTILATOR   01/05/18  15:04    


 


Vent Mode  ACCESSED/CONTROL   01/05/18  15:04    


 


FiO2  100.00   01/05/18  15:04    


 


Tidal Volume  500 mL  01/05/18  15:04    


 


PEEP  7 cmH2O  01/05/18  15:04    


 


EPAP  5 cmH2O  01/05/18  12:23    


 


IPAP  16 cmH2O  01/05/18  12:23    


 


Sodium  142 mmol/L (135-145)   01/05/18  04:10    


 


Potassium  3.5 mmol/L (3.5-5.0)   01/05/18  04:10    


 


Chloride  102 mmol/L (101-111)   01/05/18  04:10    


 


Carbon Dioxide  31 mmol/L (21-32)   01/05/18  04:10    


 


Anion Gap  9.0  (6-13)   01/05/18  04:10    


 


BUN  29 mg/dL (6-20)  H  01/05/18  04:10    


 


Creatinine  1.7 mg/dL (0.4-1.0)  H  01/05/18  04:10    


 


Estimated GFR (MDRD)  32  (>89)  L  01/05/18  04:10    


 


Glucose  133 mg/dL ()  H  01/05/18  04:10    


 


POC Whole Bld Glucose  119 mg/dL (70 - 100)  H  01/05/18  12:02    


 


Glycated Hemoglobin  10.9 % (4.6-6.2)  H  01/02/18  04:45    


 


Estim Average Glucose  266  ()  H  01/02/18  04:45    


 


Lactic Acid  0.7 mmol/L (0.5-2.2)   01/01/18  16:00    


 


Calcium  8.4 mg/dL (8.5-10.3)  L  01/05/18  04:10    


 


Ionized Calcium  NO   01/05/18  04:10    


 


Phosphorus  3.9 mg/dL (2.5-4.6)   01/05/18  04:10    


 


Magnesium  1.5 mg/dL (1.7-2.8)  L  01/05/18  04:10    


 


Total Bilirubin  0.5 mg/dL (0.2-1.0)   01/05/18  04:10    


 


AST  15 IU/L (10-42)   01/05/18  04:10    


 


ALT  13 IU/L (10-60)   01/05/18  04:10    


 


Alkaline Phosphatase  74 IU/L ()   01/05/18  04:10    


 


Troponin I  < 0.04 ng/mL (<0.49)   01/01/18  16:00    


 


B-Natriuretic Peptide  481 pg/mL (5-100)  H  01/05/18  04:10    


 


Total Protein  5.7 g/dL (6.7-8.2)  L  01/05/18  04:10    


 


Albumin  2.2 g/dL (3.2-5.5)  L  01/05/18  04:10    


 


Globulin  3.5 g/dL (2.1-4.2)   01/05/18  04:10    


 


Albumin/Globulin Ratio  0.6  (1.0-2.2)  L  01/05/18  04:10    


 


Urine Color  YELLOW   01/01/18  21:20    


 


Urine Clarity  HAZY  (CLEAR)   01/01/18  21:20    


 


Urine pH  6.0 PH (5.0-7.5)   01/01/18  21:20    


 


Ur Specific Gravity  1.025  (1.002-1.030)   01/01/18  21:20    


 


Urine Protein  >=300 mg/dL (NEGATIVE)  H  01/01/18  21:20    


 


Urine Glucose (UA)  250 mg/dL (NEGATIVE)  H  01/01/18  21:20    


 


Urine Ketones  NEGATIVE mg/dL (NEGATIVE)   01/01/18  21:20    


 


Urine Occult Blood  MODERATE  (NEGATIVE)  H  01/01/18  21:20    


 


Urine Nitrite  NEGATIVE  (NEGATIVE)   01/01/18  21:20    


 


Urine Bilirubin  NEGATIVE  (NEGATIVE)   01/01/18  21:20    


 


Urine Urobilinogen  0.2 (NORMAL) E.U./dL (NORMAL)   01/01/18  21:20    


 


Ur Leukocyte Esterase  NEGATIVE  (NEGATIVE)   01/01/18  21:20    


 


Urine RBC  0-5 /HPF (0-5)   01/01/18  21:20    


 


Urine WBC  0-3 /HPF (0-5)   01/01/18  21:20    


 


Ur Squamous Epith Cells  NONE SEEN  (<= Few)   01/01/18  21:20    


 


Amorphous Sediment  Moderate /LPF  01/01/18  21:20    


 


Urine Bacteria  None Seen /HPF (None Seen)   01/01/18  21:20    


 


Urine Casts  0-2 Fine Granular /LPF3-5 Hyaline Casts /LPF  01/01/18  21:20    


 


Urine Casts  0-2 Fine Granular /LPF3-5 Hyaline Casts /LPF  01/01/18  21:20    


 


Ur Microscopic Review  INDICATED   01/01/18  21:20    


 


Urine Culture Comments  NOT INDICATED   01/01/18  21:20    


 


Influenza A (Rapid)  Negative  (Negative)   01/01/18  16:00    


 


Influenza B (Rapid)  Negative  (Negative)   01/01/18  16:00    


 


Influenza Types A,B Ag  -   01/01/18  16:00 No complaints

## 2018-06-12 NOTE — PROGRESS NOTE BEHAVIORAL HEALTH - ORIENTED TO TIME
Understanding Middle Ear Infections in Children    Middle ear infections are most common in children under age 5. Crankiness, a fever, and tugging at or rubbing the ear may all be signs that your child has a middle ear infection. This is especially true if your child has a cold or other viral illness. It's important to call your healthcare provider if you see these or any of the signs listed below.  Call your child's healthcare provider if you notice any signs of a middle ear infection.   What are middle ear infections?  Middle ear infections occur behind the eardrum. The eardrum is the thin sheet of tissue that passes sound waves between the outer and middle ear. These infections are usually caused by bacteria or viruses. These are often related to a recent cold or allergy problem.  A blocked tube  In young children, these bacteria or viruses likely reach the middle ear by traveling the short length of the eustachian tube from the back of the nose. Once in the middle ear, they multiply and spread. This irritates delicate tissues lining the middle ear and eustachian tube. If the tube lining swells enough to block off the tube, air pressure drops in the middle ear. This pulls the eardrum inward, making it stiffer and less able to transmit sound.  Fluid buildup causes pain  Once the eustachian tube swells shut, moisture can t drain from the middle ear. Fluid that should flush out the infection builds up in the chamber. This may raise pressure behind the eardrum. This can decrease pain slightly. But if the infection spreads to this fluid, pressure behind the eardrum goes way up. The eardrum is forced outward. It becomes painful, and may break.  Chronic fluid affects hearing  If the eardrum doesn t break and the tube remains blocked, the fluid becomes an ongoing (chronic) condition. As the immediate (acute) infection passes, the middle ear fluid thickens. It becomes sticky and takes up less space. Pressure drops in  the middle ear once more. Inward suction stiffens the eardrum. This affects hearing. If the fluid is not removed, the eardrum may be stretched and damaged.  Signs of middle ear problems    A fever over 100.4 F (38.0 C) and cold symptoms    Severe ear pain    Any kind of discharge from the ear    Ear pain that gets worse or doesn t go away after a few days   When to call your child's healthcare provider  Call your child's healthcare provider's office if your otherwise healthy child has any of the signs or symptoms described below:    Fever (see Fever and children, below)    Your child has had a seizure caused by the fever    Rapid breathing or shortness of breath    A stiff neck or headache    Trouble swallowing    Your child acts ill after the fever is gone    Persistent brown, green, or bloody mucus    Signs of dehydration. These include severe thirst, dark yellow urine, infrequent urination, dull or sunken eyes, dry skin, and dry or cracked lips.    Your child still doesn't look or act right to you, even after taking a non-aspirin pain reliever  Fever and children  Always use a digital thermometer to check your child s temperature. Never use a mercury thermometer.  For infants and toddlers, be sure to use a rectal thermometer correctly. A rectal thermometer may accidentally poke a hole in (perforate) the rectum. It may also pass on germs from the stool. Always follow the product maker s directions for proper use. If you don t feel comfortable taking a rectal temperature, use another method. When you talk to your child s healthcare provider, tell him or her which method you used to take your child s temperature.  Here are guidelines for fever temperature. Ear temperatures aren t accurate before 6 months of age. Don t take an oral temperature until your child is at least 4 years old.  Infant under 3 months old:    Ask your child s healthcare provider how you should take the temperature.    Rectal or forehead  (temporal artery) temperature of 100.4 F (38 C) or higher, or as directed by the provider    Armpit temperature of 99 F (37.2 C) or higher, or as directed by the provider  Child age 3 to 36 months:    Rectal, forehead (temporal artery), or ear temperature of 102 F (38.9 C) or higher, or as directed by the provider    Armpit temperature of 101 F (38.3 C) or higher, or as directed by the provider  Child of any age:    Repeated temperature of 104 F (40 C) or higher, or as directed by the provider    Fever that lasts more than 24 hours in a child under 2 years old. Or a fever that lasts for 3 days in a child 2 years or older.   Date Last Reviewed: 11/1/2016 2000-2017 [employer]. [address] [place] [address]. All rights reserved. This information is not intended as a substitute for professional medical care. Always follow your healthcare professional's instructions.         No

## 2020-10-12 NOTE — ED ADULT TRIAGE NOTE - MEANS OF ARRIVAL
pt. denies pain, chills, SOB, dizziness, lightheadedness pt. denies pain, chills, SOB, dizziness, lightheadedness. Abdomen soft, nondistended ambulatory

## 2021-07-30 NOTE — DISCHARGE NOTE ADULT - NSTOBACCOWEBSITE_GEN_A_NCS
[place] Website --- [email or web address] website --- [email or web address] [place] Counseling: I discussed with the patient the risks of ixekizumab including but not limited to immunosuppression, serious infections, worsening of inflammatory bowel disease and drug reactions.  The patient understands that monitoring is required including a PPD at baseline and must alert us or the primary physician if symptoms of infection or other concerning signs are noted.

## 2022-11-11 NOTE — H&P ADULT - DOES THIS PATIENT HAVE A HISTORY OF OR HAS BEEN DX WITH HEART FAILURE?
met with Pt at bedside to complete MOON. Pt was explained DOVE. Pt verbalized understanding of DOVE and signed. DOVE scanned to .       11/11/22 1045   DOVE Message   Medicare Outpatient and Observation Notification regarding financial responsibility Given to patient/caregiver;Explained to patient/caregiver;Signed/date by patient/caregiver   Date DOVE was signed 11/11/22   Time DOVE was signed 4012      no

## 2023-01-30 NOTE — PROGRESS NOTE BEHAVIORAL HEALTH - MODIFICATIONS
see below Medical Necessity Statement: Based on my medical judgement, Mohs surgery is the most appropriate treatment for this cancer compared to other treatments.

## 2023-04-26 NOTE — PROGRESS NOTE BEHAVIORAL HEALTH - REMOTE MEMORY
Detail Level: Detailed
Quality 110: Preventive Care And Screening: Influenza Immunization: Influenza Immunization previously received during influenza season
Impaired

## 2025-05-27 NOTE — ED ADULT TRIAGE NOTE - SOURCE OF INFORMATION
----- Message from Iftikhar Bush MD sent at 5/21/2025  3:16 PM CDT -----  Please call  Polyp biopsies benign  Redo colonoscopy 3 years unless concerns arise in the meantime    The pathology results demonstrated Hyperplastic and Tubular adenoma     Patient